# Patient Record
Sex: FEMALE | Race: WHITE | Employment: OTHER | URBAN - METROPOLITAN AREA
[De-identification: names, ages, dates, MRNs, and addresses within clinical notes are randomized per-mention and may not be internally consistent; named-entity substitution may affect disease eponyms.]

---

## 2017-11-13 ENCOUNTER — HOSPITAL ENCOUNTER (OUTPATIENT)
Dept: PREADMISSION TESTING | Age: 52
Discharge: HOME | End: 2017-11-13
Admitting: ORTHOPAEDIC SURGERY

## 2017-11-13 VITALS — HEIGHT: 66 IN | BODY MASS INDEX: 29.73 KG/M2 | WEIGHT: 185 LBS

## 2017-11-13 RX ORDER — TIZANIDINE 4 MG/1
4 TABLET ORAL NIGHTLY
COMMUNITY
End: 2019-04-16

## 2017-11-13 RX ORDER — MELOXICAM 15 MG/1
15 TABLET ORAL DAILY
COMMUNITY
End: 2019-04-16

## 2017-11-13 RX ORDER — CHLORHEXIDINE GLUCONATE 0.12 MG/ML
15 RINSE ORAL 2 TIMES DAILY
Status: CANCELLED | OUTPATIENT
Start: 2017-11-13 | End: 2017-11-14

## 2017-11-13 RX ORDER — METOPROLOL SUCCINATE 50 MG/1
50 TABLET, EXTENDED RELEASE ORAL DAILY
COMMUNITY
End: 2019-09-06 | Stop reason: SDUPTHER

## 2017-11-13 RX ORDER — CHLORHEXIDINE GLUCONATE 0.12 MG/ML
15 RINSE ORAL 2 TIMES DAILY
Status: CANCELLED | OUTPATIENT
Start: 2017-11-13

## 2017-11-13 RX ORDER — LITHIUM CARBONATE 300 MG/1
300 CAPSULE ORAL 2 TIMES DAILY
COMMUNITY
End: 2019-04-16

## 2017-11-13 RX ORDER — OXYCODONE AND ACETAMINOPHEN 10; 325 MG/1; MG/1
1 TABLET ORAL EVERY 4 HOURS PRN
COMMUNITY

## 2017-11-13 NOTE — PROGRESS NOTES
(exception would be medication instructions below only)     5. MEDICATIONS    Take the following medications with a SMALL sip of water: Prozac, Valium, Lithium, Metoprolol   Use your usual dose of inhalers the morning of surgery. BRING your rescue inhaler with you to hospital.    Anesthesia does NOT want you to take insulin the morning of surgery. They will control your blood sugar while you are at the hospital. Please contact your ordering physician for instructions regarding your insulin the night before your procedure. If you have an insulin pump, please keep it set on basal rate. 6. Do not swallow water when brushing teeth. No gum, candy, mints or ice chips. Refrain from smoking or at least decrease the amount. 7. Dress in loose, comfortable clothing appropriate for redressing after your procedure. Do not wear jewelry (including body piercings), make-up (especially NO eye make-up), fingernail polish (NO toenail polish if foot/leg surgery), lotion, powders or metal hairclips. 8. Dentures, glasses, or contacts will need to be removed before your procedure. Bring cases for your glasses, contacts, dentures, or hearing aids to protect them while you are in surgery. 9. If you use a CPAP, please bring it with you on the day of your procedure. 10. We recommend that valuable personal  belongings, such as credit cards, cash, cell phones, e-tablets or jewelry, be left at home during your stay. The hospital will not be responsible for valuables that are not secured in the hospital safe. However, if your insurance requires a co-pay, you may want to bring a method of payment, i.e. Check or credit card, if you wish to pay your co-pay the day of surgery. 11. If you are to stay overnight, you may bring a bag with personal items. Please have any large items you may need brought in by your family after your arrival to your hospital room.     12. If you have a Living Will or Durable Power of [employer], please bring a copy on the day of your procedure. 15.  With your permission, one family member may accompany you while you are being prepared for surgery. Once you are ready, additional family members may join you. HOW WE KEEP YOU SAFE and WORK TO PREVENT SURGICAL SITE INFECTIONS:  1. Health care workers should always check your ID bracelet to verify your name and birth date. You will be asked many times to state your name, date of birth, and allergies. 2. Health care workers should always clean their hands with soap or alcohol gel before providing care to you. It is okay to ask anyone if they cleaned their hands before they touch you. 3. You will be actively involved in verifying the type of procedure you are having and ensuring the correct surgical site. This will be confirmed multiple times prior to your procedure. Do NOT ryan your surgery site UNLESS instructed to by your surgeon. 4. Do not shave or wax for 72 hours prior to procedure near your operative site. Shaving with a razor can irritate your skin and make it easier to develop an infection. On the day of your procedure, any hair that needs to be removed near the surgical site will be clipped by a healthcare worker using a special clippers designed to avoid skin irritation. 5. When you are in the operating room, your surgical site will be cleansed with a special soap, and in most cases, you will be given an antibiotic before the surgery begins. AFTER YOUR PROCEDURE:  1. For comfort and safety, arrange to have someone at home with you for the first 24 hours after discharge. 2. You and your family will be given written instructions about your diet, activity, dressing care, medications, and return visits. 3. Always clean your hands before and after caring for your wound. Do not let your family touch your surgery site without cleaning their hands.    4. Mild nausea, headache, muscle aches, sore throat, or fatigue may occur after anesthesia. Should any of these symptoms become severe, or should you notice any signs of infection, you should call your surgeon. 5. Narcotic pain medications can cause significant constipation. You may want to add a stool softener to your postoperative medication schedule or speak to your surgeon on how best to manage this SIDE EFFECT. SPECIAL INSTRUCTIONS     Thank you for allowing us to care for you. We strive to exceed your expectations in the overall delivery of care and service provided to you and your family. If you need to contact us for any reason, please call us at [phone]    Instructions reviewed and copy given to patient during preadmission testing visit. Soumya Tex. 11/13/2017 .1:43 PM      ADDITIONAL EDUCATIONAL INFORMATION REVIEWED / PROVIDED TO YOU AND YOUR FAMILY:  Yes Taking Control of Your Pain   Yes FAQs about Surgical Site Infections  No Cardiac Surgery Instructions for AM admission to the hospital  No Learning About Preventing Pressure Sores  No Cardiac Surgery Preoperative Hibiclens® Bathing Instructions  No Your Care after Heart Surgery Binder  No Your Guide to Hip Replacement Surgery. PLEASE BRING THIS BOOKLET BACK ON THE DAY OF YOUR SURGERY. No Your Guide to Knee Replacement Surgery. PLEASE BRING THIS BOOKLET BACK ON THE DAY OF YOUR SURGERY. No Your Guide to Shoulder Replacement Surgery. PLEASE BRING THIS BOOKLET BACK ON THE DAY OF YOUR SURGERY.     No Hibiclens® Bathing Instructions   No Incentive Spirometer given to patient- PLEASE BRING THIS SPIROMETER BACK WITH YOU ON THE DAY OF YOUR SURGERY  No CMS Comprehensive Care for Joint Replacement Model Notification Letter  No Other

## 2017-11-13 NOTE — PROGRESS NOTES
[facility] / [address] [facility] Alberto Turner, [address]    Acknowledgment of Informed Consent for Surgical or Medical Procedure and Sedation  I agree to allow doctor(s) FINESSE COOL [address] [place] [address] and his/her associates or assistants, including residents and/or other qualified medical practitioner to perform the following medical treatment or procedure and to administer or direct the administration of sedation as necessary:  Procedure(s): L5-S1 LUMBAR LAMINECTOMY FACETECTOMY FORAMINOTOMY TRANSFORAMINAL LUMBAR INTERBODY INTRUMENTED FUSION  My doctor has explained the following regarding the proposed procedure:   the explanation of the procedure   the benefits of the procedure   the potential problems that might occur during recuperation   the risks and side effects of the procedure which could include but are not limited to severe blood loss, infection, stroke or death   the benefits, risks and side effect of alternative procedures including the consequences of declining this procedure or any alternative procedures   the likelihood of achieving satisfactory results. I acknowledge no guarantee or assurance has been made to me regarding the results. I understand that during the course of this treatment/procedure, unforeseen conditions can occur which require an additional or different procedure. I agree to allow my physician or assistants to perform such extension of the original procedure as they may find necessary. I understand that sedation will often result in temporary impairment of memory and fine motor skills and that sedation can occasionally progress to a state of deep sedation or general anesthesia. I understand the risks of anesthesia for surgery include, but are not limited to, sore throat, hoarseness, injury to face, mouth, or teeth; nausea; headache; injury to blood vessels or nerves; death, brain damage, or paralysis.     I understand that if I have a Limitation of Treatment order in effect during my hospitalization, the order may or may not be in effect during this procedure. I give my doctor permission to give me blood or blood products. I understand that there are risks with receiving blood such as hepatitis, AIDS, fever, or allergic reaction. I acknowledge that the risks, benefits, and alternatives of this treatment have been explained to me and that no express or implied warranty has been given by the hospital, any blood bank, or any person or entity as to the blood or blood components transfused. At the discretion of my doctor, I agree to allow observers, equipment/product representatives and allow photographing, and/or televising of the procedure, provided my name or identity is maintained confidentially. I agree the hospital may dispose of or use for scientific or educational purposes any tissue, fluid, or body parts which may be removed.     ________________________________Date________Time______ am/pm  ([place] [facility])  Patient or Signature of Closest Relative or Legal Guardian    ________________________________Date________Time______am/pm      Page 1 of  1  Witness

## 2017-11-13 NOTE — PROGRESS NOTES
The following educational items and goals will be achieved upon completion of the patient's Pre-admission testing experience:             Identify the learner who is being assessed for education:  patient                    Ability to Learn:  Exhibits ability to grasp concepts and respond to questions: High  Ready to Learn: Yes  calm   Preferred Method of Learning:  verbal  Barriers to Learning: Verbalizes interest  Special Considerations due to cultural, Pentecostal, spiritual beliefs:  No  Language:  English  :  Leticia Mcdonald  [x] Appropriate evaluation / integration of data as delineated by ASPAN Standards of Perianesthesia Nursing Practice    Pain scale and pain management   [x]Patient verbalizes understanding of pain scale and pain management  [x]Pre-operative determination of patients anticipated Post-Operative pain goal:   4 of 10 on 10 point scale post op goal  [] Other     Medication(s) - Compliance with preop medication instructions  [x] Patient verbalizes understanding of preop medications (see [facility] Presurgical Instructions)    Instructions, Pre op                                                                                            [x] Patient verbalizes understanding of presurgical instructions as reviewed with phone interview nurse or in-person nurse review    Fall Risk Potential, Preoperatively                                                                                   [x]No preoperative risk identified  []Preop risk identified:                    []Sensory deficit        []Motor deficit        []Balance problem        []Home medication        []Uses assistive device                    []History of a Fall within the last 30 days    Goal(s) for fall prevention:  [x]Prevent fall or injury by requesting assistance with activities of daily living  [x]Patient / Significant other verbalizes understanding the need to call for

## 2017-11-15 PROBLEM — F32.A DEPRESSION: Status: ACTIVE | Noted: 2017-11-15

## 2017-11-15 PROBLEM — M50.30 DEGENERATION OF CERVICAL INTERVERTEBRAL DISC: Status: ACTIVE | Noted: 2017-11-15

## 2017-11-15 PROBLEM — M48.061 LUMBAR SPINAL STENOSIS: Status: ACTIVE | Noted: 2017-11-15

## 2017-11-15 PROBLEM — F32.A DEPRESSION: Status: RESOLVED | Noted: 2017-11-15 | Resolved: 2017-11-15

## 2017-11-15 PROBLEM — I10 HYPERTENSION: Status: ACTIVE | Noted: 2017-11-15

## 2017-11-15 PROBLEM — M48.062 SPINAL STENOSIS OF LUMBAR REGION WITH NEUROGENIC CLAUDICATION: Status: ACTIVE | Noted: 2017-11-15

## 2017-11-15 PROBLEM — F31.9 BIPOLAR 1 DISORDER (HCC): Status: ACTIVE | Noted: 2017-11-15

## 2018-07-25 PROBLEM — N26.1 ATROPHIC KIDNEY: Status: ACTIVE | Noted: 2018-07-25

## 2018-11-17 ENCOUNTER — APPOINTMENT (OUTPATIENT)
Dept: GENERAL RADIOLOGY | Age: 53
End: 2018-11-17
Payer: MEDICARE

## 2018-11-17 ENCOUNTER — APPOINTMENT (OUTPATIENT)
Dept: CT IMAGING | Age: 53
End: 2018-11-17
Payer: MEDICARE

## 2018-11-17 ENCOUNTER — HOSPITAL ENCOUNTER (EMERGENCY)
Age: 53
Discharge: ANOTHER HOSPITAL | End: 2018-11-17
Attending: EMERGENCY MEDICINE
Payer: MEDICARE

## 2018-11-17 VITALS
RESPIRATION RATE: 15 BRPM | SYSTOLIC BLOOD PRESSURE: 133 MMHG | HEART RATE: 68 BPM | BODY MASS INDEX: 32.76 KG/M2 | HEIGHT: 65 IN | OXYGEN SATURATION: 98 % | DIASTOLIC BLOOD PRESSURE: 96 MMHG | TEMPERATURE: 98 F | WEIGHT: 196.65 LBS

## 2018-11-17 DIAGNOSIS — F10.929 ALCOHOLIC INTOXICATION WITH COMPLICATION (HCC): ICD-10-CM

## 2018-11-17 DIAGNOSIS — S52.601A CLOSED FRACTURE OF DISTAL ENDS OF RIGHT RADIUS AND ULNA, INITIAL ENCOUNTER: ICD-10-CM
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
## 2018-11-17 DIAGNOSIS — S12.000A CLOSED DISPLACED FRACTURE OF FIRST CERVICAL VERTEBRA, UNSPECIFIED FRACTURE MORPHOLOGY, INITIAL ENCOUNTER (HCC): Primary | ICD-10-CM

## 2018-11-17 DIAGNOSIS — S49.91XA INJURY OF RIGHT SHOULDER, INITIAL ENCOUNTER: ICD-10-CM

## 2018-11-17 DIAGNOSIS — S52.501A CLOSED FRACTURE OF DISTAL ENDS OF RIGHT RADIUS AND ULNA, INITIAL ENCOUNTER: ICD-10-CM

## 2018-11-17 LAB
A/G RATIO: 1.3 (ref 1.1–2.2)
ALBUMIN SERPL-MCNC: 3.8 G/DL (ref 3.4–5)
ALP BLD-CCNC: 127 U/L (ref 40–129)
ALT SERPL-CCNC: 21 U/L (ref 10–40)
ANION GAP SERPL CALCULATED.3IONS-SCNC: 14 MMOL/L (ref 3–16)
APTT: 19.5 SEC (ref 26–36)
AST SERPL-CCNC: 34 U/L (ref 15–37)
BILIRUB SERPL-MCNC: <0.2 MG/DL (ref 0–1)
BUN BLDV-MCNC: 12 MG/DL (ref 7–20)
CALCIUM SERPL-MCNC: 8.5 MG/DL (ref 8.3–10.6)
CHLORIDE BLD-SCNC: 101 MMOL/L (ref 99–110)
CO2: 22 MMOL/L (ref 21–32)
CREAT SERPL-MCNC: 1 MG/DL (ref 0.6–1.1)
ETHANOL: 164 MG/DL (ref 0–0.08)
GFR AFRICAN AMERICAN: >60
GFR NON-AFRICAN AMERICAN: 58
GLOBULIN: 3 G/DL
GLUCOSE BLD-MCNC: 114 MG/DL (ref 70–99)
HCT VFR BLD CALC: 34.3 % (ref 36–48)
HEMOGLOBIN: 11.4 G/DL (ref 12–16)
INR BLD: 0.87 (ref 0.86–1.14)
MCH RBC QN AUTO: 27 PG (ref 26–34)
MCHC RBC AUTO-ENTMCNC: 33.2 G/DL (ref 31–36)
MCV RBC AUTO: 81.2 FL (ref 80–100)
PDW BLD-RTO: 16.5 % (ref 12.4–15.4)
PLATELET # BLD: 176 K/UL (ref 135–450)
PLATELET SLIDE REVIEW: ADEQUATE
PMV BLD AUTO: 8.2 FL (ref 5–10.5)
POTASSIUM SERPL-SCNC: 4.5 MMOL/L (ref 3.5–5.1)
PROTHROMBIN TIME: 9.9 SEC (ref 9.8–13)
RBC # BLD: 4.22 M/UL (ref 4–5.2)
SODIUM BLD-SCNC: 137 MMOL/L (ref 136–145)
TOTAL PROTEIN: 6.8 G/DL (ref 6.4–8.2)
WBC # BLD: 9.1 K/UL (ref 4–11)

## 2018-11-17 PROCEDURE — 93005 ELECTROCARDIOGRAM TRACING: CPT | Performed by: PHYSICIAN ASSISTANT

## 2018-11-17 PROCEDURE — 72125 CT NECK SPINE W/O DYE: CPT

## 2018-11-17 PROCEDURE — 6360000002 HC RX W HCPCS: Performed by: PHYSICIAN ASSISTANT

## 2018-11-17 PROCEDURE — 90715 TDAP VACCINE 7 YRS/> IM: CPT | Performed by: PHYSICIAN ASSISTANT

## 2018-11-17 PROCEDURE — 85027 COMPLETE CBC AUTOMATED: CPT

## 2018-11-17 PROCEDURE — 73090 X-RAY EXAM OF FOREARM: CPT

## 2018-11-17 PROCEDURE — 99285 EMERGENCY DEPT VISIT HI MDM: CPT

## 2018-11-17 PROCEDURE — G0480 DRUG TEST DEF 1-7 CLASSES: HCPCS

## 2018-11-17 PROCEDURE — 70450 CT HEAD/BRAIN W/O DYE: CPT

## 2018-11-17 PROCEDURE — 73130 X-RAY EXAM OF HAND: CPT

## 2018-11-17 PROCEDURE — 80053 COMPREHEN METABOLIC PANEL: CPT

## 2018-11-17 PROCEDURE — 85610 PROTHROMBIN TIME: CPT

## 2018-11-17 PROCEDURE — 90471 IMMUNIZATION ADMIN: CPT | Performed by: PHYSICIAN ASSISTANT

## 2018-11-17 PROCEDURE — 73030 X-RAY EXAM OF SHOULDER: CPT

## 2018-11-17 PROCEDURE — 36415 COLL VENOUS BLD VENIPUNCTURE: CPT

## 2018-11-17 PROCEDURE — 4500000025 HC ED LEVEL 5 PROCEDURE

## 2018-11-17 PROCEDURE — 85730 THROMBOPLASTIN TIME PARTIAL: CPT

## 2018-11-17 RX ADMIN — TETANUS TOXOID, REDUCED DIPHTHERIA TOXOID AND ACELLULAR PERTUSSIS VACCINE, ADSORBED 0.5 ML: 5; 2.5; 8; 8; 2.5 SUSPENSION INTRAMUSCULAR at 20:33

## 2018-11-17 ASSESSMENT — PAIN DESCRIPTION - PAIN TYPE: TYPE: ACUTE PAIN

## 2018-11-17 ASSESSMENT — PAIN SCALES - GENERAL
PAINLEVEL_OUTOF10: 5
PAINLEVEL_OUTOF10: 6
PAINLEVEL_OUTOF10: 5

## 2018-11-17 ASSESSMENT — ENCOUNTER SYMPTOMS
SHORTNESS OF BREATH: 0
VOMITING: 0

## 2018-11-18 NOTE — ED NOTES
Bed: A-17  Expected date:   Expected time:   Means of arrival:   Comments:  Fall/ ETOH     Consuelo Crandall RN  11/17/18 [id number]

## 2018-11-18 NOTE — ED NOTES
Report called to [facility] JORDAN Fairchild.       Alida Hernandez RN  11/17/18 7922

## 2018-11-18 NOTE — ED PROVIDER NOTES
I independently performed a history and physical on Marci Peterson. All diagnostic, treatment, and disposition decisions were made by myself in conjunction with the advanced practice provider. Briefly, this is a 52 y.o. female here for fall that occurred at home. Patient is intoxicated, and also has other substances that she does abuse at home. Patient fell and is now having neck pain, and right shoulder pain. On exam, patient was kept in a rigid collar, and to go for CT scan of the head and neck. The neck x-ray/CT demonstrates a C1 fracture, the patient will be transferred to another facility for further evaluation. The patient will be going to [address] to be seen by neurosurgery. Patient was made aware of the findings, and was agreeable with transfer. For further details of Marci Peterson's emergency department encounter, please see documentation by advanced practice provider  Jose Kimbrough.        Nicola Guevara MD  11/18/18 2789

## 2018-11-18 NOTE — ED PROVIDER NOTES
breath. Cardiovascular: Negative for chest pain. Gastrointestinal: Negative for vomiting. Musculoskeletal: Positive for arthralgias and neck pain. Skin: Positive for wound. Neurological: Negative for weakness and numbness. Psychiatric/Behavioral: Positive for confusion. Unable to perform complete review of systems due to the patient's intoxication. PAST MEDICAL HISTORY         Diagnosis Date    Atrophic kidney 7/25/2018    left    Brachial neuritis or radiculitis NOS     Degeneration of cervical intervertebral disc     Depression     Hypertension     Neck pain     Shoulder pain     left    Single kidney        SURGICAL HISTORY           Procedure Laterality Date    BACK SURGERY      cervical 2008, 2010, 2014    KNEE ARTHROSCOPY Left     LUMBAR LAMINECTOMY  11/14/2017    L5-S1;transforaminal fusion,facetectomy,foraminotomy    OTHER SURGICAL HISTORY  4/15/14    SPINAL CORD STIMULATOR TRIAL WITH PADDLE LEADS -    OTHER SURGICAL HISTORY  4/18/2014    CERVICAL LAMINECTOMY AND IMPLANT SPINAL CORD STIMULATOR    TONSILLECTOMY         CURRENT MEDICATIONS       Discharge Medication List as of 11/17/2018 10:21 PM      CONTINUE these medications which have NOT CHANGED    Details   vitamin D-3 (CHOLECALCIFEROL) 5000 units TABS Take 1 tablet by mouth daily, Disp-30 tablet, R-2Print      Cholecalciferol (VITAMIN D3) 39267 units CAPS Take 1 capsule by mouth once a week for 5 days, Disp-5 capsule, R-0Print      oxyCODONE (ROXICODONE) 5 MG immediate release tablet Take 1-2 po q 4 hours for severe pain.  Earliest Fill Date: 11/14/17, Disp-60 tablet, R-0Print      senna-docusate (PERICOLACE) 8.6-50 MG per tablet Take 2 tablets by mouth daily as needed for Constipation, Disp-30 tablet, R-1Print      magnesium citrate solution Take 296 mLs by mouth once for 1 dose, Disp-296 mL, R-2Print      oxyCODONE-acetaminophen (PERCOCET)  MG per tablet Take 1 tablet by mouth every 4 hours as needed for Pain .Historical Med      tiZANidine (ZANAFLEX) 4 MG tablet Take 4 mg by mouth nightly Two tablets at nightHistorical Med      lithium 300 MG capsule Take 300 mg by mouth 2 times dailyHistorical Med      metoprolol succinate (TOPROL XL) 50 MG extended release tablet Take 50 mg by mouth daily Two tabletsHistorical Med      meloxicam (MOBIC) 15 MG tablet Take 15 mg by mouth dailyHistorical Med      gabapentin (NEURONTIN) 600 MG tablet Take 800 mg by mouth 3 times daily Historical Med      diazepam (VALIUM) 5 MG tablet Take 5 mg by mouth 3 times daily  . Historical Med      FLUoxetine (PROZAC) 10 MG capsule Take 40 mg by mouth 2 times daily Historical Med      Multiple Vitamins-Minerals (THERAPEUTIC MULTIVITAMIN-MINERALS) tablet Take 1 tablet by mouth daily. ALLERGIES     Patient has no known allergies. FAMILY HISTORY     History reviewed. No pertinent family history. No family status information on file. SOCIAL HISTORY      reports that she has been smoking. She has never used smokeless tobacco. She reports that she drinks alcohol. She reports that she does not use drugs. PHYSICAL EXAM    (up to 7 for level 4, 8 or more for level 5)     ED Triage Vitals   BP Temp Temp src Pulse Resp SpO2 Height Weight   -- -- -- -- -- -- -- --     Physical Exam   Constitutional: She appears well-developed and well-nourished. No distress. HENT:   Head: Head is with abrasion and with contusion. Neck: Spinous process tenderness present. Cardiovascular: Normal rate and intact distal pulses. Pulmonary/Chest: Effort normal. No respiratory distress. Musculoskeletal:        Right shoulder: She exhibits tenderness, bony tenderness and pain. She exhibits normal pulse. Right wrist: She exhibits decreased range of motion, tenderness, bony tenderness and swelling. Arms:  Neurological: She is alert. GCS eye subscore is 4. GCS verbal subscore is 5. GCS motor subscore is 6.    Psychiatric: Her speech is slurred. Cognition and memory are impaired. Nursing note and vitals reviewed. DIAGNOSTIC RESULTS     EKG: All EKG's are interpreted by HAWK Kingston in the absence of a cardiologist.    EKG interpreted by myself - please refer to attending physician's note for complete EKG interpretation:    Rhythm: sinus rhythm   No evidence of acute ischemia or injury. RADIOLOGY:   Non-plain film images such as CT, Ultrasound and MRI are read by the radiologist. Plain radiographic images are visualized and preliminarily interpreted by HAWK Kingston with the below findings:    Reviewed radiologist's dictation. Interpretation per the Radiologist below, if available at the time of this note:    XR HAND RIGHT (MIN 3 VIEWS)   Preliminary Result   1. Acute distal right radial metaphyseal fracture with mild ulnar and   posterior displacement. No definitive radiocarpal involvement. 2. Displaced and comminuted distal right ulnar shaft fracture. 3. Osteopenia. XR RADIUS ULNA RIGHT (2 VIEWS)   Final Result   1. Acute displaced linear fracture through the distal diaphysis of the right   ulna. 2.  Acute displaced to horizontal linear fracture through the distal   metaphysis of the right radius. CT Cervical Spine WO Contrast   Preliminary Result   1. Comminuted and displaced C1 ring fracture. Widening of the predental   space. Prevertebral hematoma. 2. Multilevel postsurgical change. This report was discussed with Dr. Janice Navarro at 9:15 a.m. on 11/17/2018. CT Head WO Contrast   Preliminary Result   1. No evidence of acute intracranial process. 2. Comminuted and displaced C1 ring fracture. 3. Large frontal scalp hematoma and laceration. This report was discussed with Dr. Edwin Larsen (MIN 2 VIEWS)   Final Result   Mild widening of the right acromioclavicular joint concerning for right AC   joint injury. Please correlate with focal point tenderness.

## 2018-11-18 NOTE — ED NOTES
MD Ky Foote at bedside for pt evaluation and plan of care update.       Michelle Avalos RN  11/17/18 2810

## 2018-11-19 LAB
EKG ATRIAL RATE: 79 BPM
EKG DIAGNOSIS: NORMAL
EKG P AXIS: 66 DEGREES
EKG P-R INTERVAL: 128 MS
EKG Q-T INTERVAL: 442 MS
EKG QRS DURATION: 98 MS
EKG QTC CALCULATION (BAZETT): 506 MS
EKG R AXIS: 11 DEGREES
EKG T AXIS: 32 DEGREES
EKG VENTRICULAR RATE: 79 BPM

## 2018-11-19 PROCEDURE — 93010 ELECTROCARDIOGRAM REPORT: CPT | Performed by: INTERNAL MEDICINE

## 2018-12-08 ENCOUNTER — HOSPITAL ENCOUNTER (EMERGENCY)
Age: 53
Discharge: HOME | End: 2018-12-08
Attending: EMERGENCY MEDICINE
Payer: MEDICARE

## 2018-12-08 VITALS
DIASTOLIC BLOOD PRESSURE: 99 MMHG | HEART RATE: 73 BPM | SYSTOLIC BLOOD PRESSURE: 152 MMHG | OXYGEN SATURATION: 99 % | RESPIRATION RATE: 17 BRPM

## 2018-12-08 DIAGNOSIS — M54.2 NECK PAIN: Primary | ICD-10-CM

## 2018-12-08 PROCEDURE — 6370000000 HC RX 637 (ALT 250 FOR IP): Performed by: EMERGENCY MEDICINE

## 2018-12-08 PROCEDURE — 99283 EMERGENCY DEPT VISIT LOW MDM: CPT

## 2018-12-08 RX ORDER — OXYCODONE HYDROCHLORIDE 5 MG/1
5 TABLET ORAL EVERY 4 HOURS PRN
Status: DISCONTINUED | OUTPATIENT
Start: 2018-12-08 | End: 2018-12-09 | Stop reason: HOSPADM

## 2018-12-08 RX ORDER — OXYCODONE HYDROCHLORIDE AND ACETAMINOPHEN 5; 325 MG/1; MG/1
1 TABLET ORAL ONCE
Status: COMPLETED | OUTPATIENT
Start: 2018-12-08 | End: 2018-12-08

## 2018-12-08 RX ADMIN — OXYCODONE HYDROCHLORIDE 5 MG: 5 TABLET ORAL at 22:12

## 2018-12-08 RX ADMIN — OXYCODONE AND ACETAMINOPHEN 1 TABLET: 5; 325 TABLET ORAL at 22:12

## 2018-12-08 ASSESSMENT — ENCOUNTER SYMPTOMS
ABDOMINAL PAIN: 0
SHORTNESS OF BREATH: 0
VOMITING: 0
COUGH: 0
NAUSEA: 0

## 2018-12-08 ASSESSMENT — PAIN SCALES - GENERAL: PAINLEVEL_OUTOF10: 10

## 2018-12-08 ASSESSMENT — PAIN DESCRIPTION - LOCATION: LOCATION: NECK

## 2018-12-08 ASSESSMENT — PAIN DESCRIPTION - DESCRIPTORS: DESCRIPTORS: ACHING

## 2018-12-08 ASSESSMENT — PAIN DESCRIPTION - PAIN TYPE: TYPE: CHRONIC PAIN

## 2018-12-09 NOTE — ED PROVIDER NOTES
been smoking. She has never used smokeless tobacco. She reports that she drinks alcohol. She reports that she does not use drugs. Medications     Previous Medications    CHOLECALCIFEROL (VITAMIN D3) 31480 UNITS CAPS    Take 1 capsule by mouth once a week for 5 days    DIAZEPAM (VALIUM) 5 MG TABLET    Take 5 mg by mouth 3 times daily  . FLUOXETINE (PROZAC) 10 MG CAPSULE    Take 40 mg by mouth 2 times daily     GABAPENTIN (NEURONTIN) 600 MG TABLET    Take 800 mg by mouth 3 times daily     LITHIUM 300 MG CAPSULE    Take 300 mg by mouth 2 times daily    MAGNESIUM CITRATE SOLUTION    Take 296 mLs by mouth once for 1 dose    MELOXICAM (MOBIC) 15 MG TABLET    Take 15 mg by mouth daily    METOPROLOL SUCCINATE (TOPROL XL) 50 MG EXTENDED RELEASE TABLET    Take 50 mg by mouth daily Two tablets    MULTIPLE VITAMINS-MINERALS (THERAPEUTIC MULTIVITAMIN-MINERALS) TABLET    Take 1 tablet by mouth daily. OXYCODONE (ROXICODONE) 5 MG IMMEDIATE RELEASE TABLET    Take 1-2 po q 4 hours for severe pain. Earliest Fill Date: 11/14/17    OXYCODONE-ACETAMINOPHEN (PERCOCET)  MG PER TABLET    Take 1 tablet by mouth every 4 hours as needed for Pain . SENNA-DOCUSATE (PERICOLACE) 8.6-50 MG PER TABLET    Take 2 tablets by mouth daily as needed for Constipation    TIZANIDINE (ZANAFLEX) 4 MG TABLET    Take 4 mg by mouth nightly Two tablets at night    VITAMIN D-3 (CHOLECALCIFEROL) 5000 UNITS TABS    Take 1 tablet by mouth daily       Allergies     She has No Known Allergies. Physical Exam     INITIAL VITALS: BP: (!) 152/99,  , Pulse: 73, Resp: 17, SpO2: 99 %   Physical Exam  Constitutional: Well nourished  female who appears in no acute distress. HEENT: NC/AT. PERRL 4-2 bilaterally. EOMI. her neck is in a [place] J brace    CV:Heart is regular rate and rhythm without murmurs, rubs or gallops. Resp: Respirations unlabored. Lungs clear to auscultation w/o wheezing. Abd: Soft, nontender, nondistended.      MSK:

## 2019-04-16 ENCOUNTER — OFFICE VISIT (OUTPATIENT)
Dept: FAMILY MEDICINE CLINIC | Age: 54
End: 2019-04-16
Payer: COMMERCIAL

## 2019-04-16 VITALS
HEIGHT: 66 IN | HEART RATE: 61 BPM | SYSTOLIC BLOOD PRESSURE: 118 MMHG | BODY MASS INDEX: 28.28 KG/M2 | WEIGHT: 176 LBS | DIASTOLIC BLOOD PRESSURE: 82 MMHG

## 2019-04-16 DIAGNOSIS — Z12.4 CERVICAL CANCER SCREENING: ICD-10-CM

## 2019-04-16 DIAGNOSIS — Z12.11 COLON CANCER SCREENING: ICD-10-CM

## 2019-04-16 DIAGNOSIS — I10 HYPERTENSION, UNSPECIFIED TYPE: ICD-10-CM

## 2019-04-16 DIAGNOSIS — D64.9 ANEMIA, UNSPECIFIED TYPE: ICD-10-CM

## 2019-04-16 DIAGNOSIS — M54.2 NECK PAIN: ICD-10-CM

## 2019-04-16 DIAGNOSIS — Z12.39 BREAST CANCER SCREENING: ICD-10-CM

## 2019-04-16 DIAGNOSIS — F31.9 BIPOLAR 1 DISORDER (HCC): Primary | ICD-10-CM

## 2019-04-16 LAB
A/G RATIO: 1.6 (ref 1.1–2.2)
ALBUMIN SERPL-MCNC: 4.3 G/DL (ref 3.4–5)
ALP BLD-CCNC: 138 U/L (ref 40–129)
ALT SERPL-CCNC: 23 U/L (ref 10–40)
ANION GAP SERPL CALCULATED.3IONS-SCNC: 11 MMOL/L (ref 3–16)
AST SERPL-CCNC: 21 U/L (ref 15–37)
BASOPHILS ABSOLUTE: 0.1 K/UL (ref 0–0.2)
BASOPHILS RELATIVE PERCENT: 0.9 %
BILIRUB SERPL-MCNC: <0.2 MG/DL (ref 0–1)
BUN BLDV-MCNC: 18 MG/DL (ref 7–20)
CALCIUM SERPL-MCNC: 9.3 MG/DL (ref 8.3–10.6)
CHLORIDE BLD-SCNC: 104 MMOL/L (ref 99–110)
CO2: 26 MMOL/L (ref 21–32)
CREAT SERPL-MCNC: 1.3 MG/DL (ref 0.6–1.1)
EOSINOPHILS ABSOLUTE: 0.4 K/UL (ref 0–0.6)
EOSINOPHILS RELATIVE PERCENT: 3.7 %
GFR AFRICAN AMERICAN: 51
GFR NON-AFRICAN AMERICAN: 42
GLOBULIN: 2.7 G/DL
GLUCOSE BLD-MCNC: 88 MG/DL (ref 70–99)
HCT VFR BLD CALC: 41.7 % (ref 36–48)
HEMOGLOBIN: 13.7 G/DL (ref 12–16)
LYMPHOCYTES ABSOLUTE: 2 K/UL (ref 1–5.1)
LYMPHOCYTES RELATIVE PERCENT: 21 %
MCH RBC QN AUTO: 28.9 PG (ref 26–34)
MCHC RBC AUTO-ENTMCNC: 32.8 G/DL (ref 31–36)
MCV RBC AUTO: 88.3 FL (ref 80–100)
MONOCYTES ABSOLUTE: 0.8 K/UL (ref 0–1.3)
MONOCYTES RELATIVE PERCENT: 8.3 %
NEUTROPHILS ABSOLUTE: 6.3 K/UL (ref 1.7–7.7)
NEUTROPHILS RELATIVE PERCENT: 66.1 %
PDW BLD-RTO: 18 % (ref 12.4–15.4)
PLATELET # BLD: 440 K/UL (ref 135–450)
PMV BLD AUTO: 7.8 FL (ref 5–10.5)
POTASSIUM SERPL-SCNC: 5.6 MMOL/L (ref 3.5–5.1)
RBC # BLD: 4.72 M/UL (ref 4–5.2)
SODIUM BLD-SCNC: 141 MMOL/L (ref 136–145)
TOTAL PROTEIN: 7 G/DL (ref 6.4–8.2)
TSH SERPL DL<=0.05 MIU/L-ACNC: 2.23 UIU/ML (ref 0.27–4.2)
WBC # BLD: 9.5 K/UL (ref 4–11)

## 2019-04-16 PROCEDURE — 36415 COLL VENOUS BLD VENIPUNCTURE: CPT | Performed by: FAMILY MEDICINE

## 2019-04-16 PROCEDURE — 99204 OFFICE O/P NEW MOD 45 MIN: CPT | Performed by: FAMILY MEDICINE

## 2019-04-16 ASSESSMENT — ENCOUNTER SYMPTOMS
ABDOMINAL PAIN: 0
RHINORRHEA: 0
SORE THROAT: 0
SINUS PRESSURE: 0
DIARRHEA: 0
FACIAL SWELLING: 0
COUGH: 0
SHORTNESS OF BREATH: 0
NAUSEA: 0
CHEST TIGHTNESS: 0
VOMITING: 0
WHEEZING: 0
TROUBLE SWALLOWING: 0

## 2019-04-16 NOTE — PROGRESS NOTES
2019    Marci Peterson (:  1965) is a 52 y.o. female, here to establish care with the pcp. Patient has numerous concerns and is very complex. She wants to discuss her concerns but doesn't have a new problem today. She has a Psychiatry evaluation next month. 1.  Bipolar- patient has an upcoming appointment with Psychiatry. She is on Prozac 80 mg daily as well as Valium 5 mg TID. She has been on this combination for several years but believes it is not as effective as it once was. 2.  Neck pain- had surgery last year due to cervical fracture. She has also had multiple surgeries for spinal stenosis. Patient is on pain medication by another provider and stated this is the only relief she get. Her physician is comfortable for her being on both an opiate and Valium understanding that this seems to be the only treatment that providers her with an acceptable quality of life. 3.  Anemia- believes it is iron def. Hasn't been tested for sometime. Wants labs today. Denied excessive fatigue, denied bleeding, did acknowledge she needed a colonoscopy as well as other well being procedures. 4. HTN- well controlled, takes medication as prescribed, doesn't believe she is having side effects from the medication. Today, she denied chest pain, sob, n, v, or diarrhea. HPI    Review of Systems   Constitutional: Positive for fatigue. Negative for activity change, fever and unexpected weight change. HENT: Negative for congestion, ear pain, facial swelling, mouth sores, rhinorrhea, sinus pressure, sore throat and trouble swallowing. Eyes: Negative for visual disturbance. Respiratory: Negative for cough, chest tightness, shortness of breath and wheezing. Cardiovascular: Negative for chest pain and leg swelling. Gastrointestinal: Negative for abdominal pain, diarrhea, nausea and vomiting. Endocrine: Negative for cold intolerance, heat intolerance, polydipsia and polyphagia. STIMULATOR TRIAL WITH PADDLE LEADS -    OTHER SURGICAL HISTORY  2014    CERVICAL LAMINECTOMY AND IMPLANT SPINAL CORD STIMULATOR    TONSILLECTOMY         Social History     Socioeconomic History    Marital status:      Spouse name: Not on file    Number of children: Not on file    Years of education: Not on file    Highest education level: Not on file   Occupational History    Not on file   Social Needs    Financial resource strain: Not on file    Food insecurity:     Worry: Not on file     Inability: Not on file    Transportation needs:     Medical: Not on file     Non-medical: Not on file   Tobacco Use    Smoking status: Former Smoker     Packs/day: 0.25     Years: 40.00     Pack years: 10.00     Types: Cigarettes     Last attempt to quit: 2016     Years since quittin.4    Smokeless tobacco: Never Used   Substance and Sexual Activity    Alcohol use: Yes     Comment: rarely    Drug use: No    Sexual activity: Never   Lifestyle    Physical activity:     Days per week: Not on file     Minutes per session: Not on file    Stress: Not on file   Relationships    Social connections:     Talks on phone: Not on file     Gets together: Not on file     Attends [employer] service: Not on file     Active member of club or organization: Not on file     Attends meetings of clubs or organizations: Not on file     Relationship status: Not on file    Intimate partner violence:     Fear of current or ex partner: Not on file     Emotionally abused: Not on file     Physically abused: Not on file     Forced sexual activity: Not on file   Other Topics Concern    Not on file   Social History Narrative    Not on file        No family history on file. Vitals:    19 1023   BP: 118/82   Pulse: 61   Weight: 176 lb (79.8 kg)   Height: 5' 5.5\" (1.664 m)     Estimated body mass index is 28.84 kg/m² as calculated from the following:    Height as of this encounter: 5' 5.5\" (1.664 m).     Weight as of this encounter: 176 lb (79.8 kg). Chemistry        Component Value Date/Time     04/16/2019 1103    K 5.6 (H) 04/16/2019 1103    K 3.6 06/24/2018 1751     04/16/2019 1103    CO2 26 04/16/2019 1103    BUN 18 04/16/2019 1103    CREATININE 1.3 (H) 04/16/2019 1103        Component Value Date/Time    CALCIUM 9.3 04/16/2019 1103    ALKPHOS 138 (H) 04/16/2019 1103    AST 21 04/16/2019 1103    ALT 23 04/16/2019 1103    BILITOT <0.2 04/16/2019 1103          Lab Results   Component Value Date    WBC 9.5 04/16/2019    HGB 13.7 04/16/2019    HCT 41.7 04/16/2019    MCV 88.3 04/16/2019     04/16/2019     No results found for: LABA1C  No results found for: EAG  No results found for: LABA1C  No components found for: CHLPL  No results found for: TRIG  No results found for: HDL  No results found for: LDLCALC  No results found for: LABVLDL    Physical Exam   Constitutional: She is oriented to person, place, and time. She appears well-developed and well-nourished. HENT:   Head: Normocephalic and atraumatic. Right Ear: External ear normal.   Left Ear: External ear normal.   Nose: Nose normal.   Mouth/Throat: Oropharynx is clear and moist.   Eyes: Pupils are equal, round, and reactive to light. Conjunctivae are normal.   Neck: Neck supple. No thyromegaly present. Surgical cervical scar, reduced ROM in all directed, pain with palpation. Cardiovascular: Normal rate, regular rhythm, normal heart sounds and intact distal pulses. Pulmonary/Chest: Effort normal and breath sounds normal. She has no wheezes. Abdominal: Soft. Bowel sounds are normal. There is no tenderness. There is no guarding. Musculoskeletal: Normal range of motion. Lymphadenopathy:     She has no cervical adenopathy. Neurological: She is alert and oriented to person, place, and time. No cranial nerve deficit. Skin: Skin is warm and dry.    Psychiatric: Her behavior is normal. Judgment and thought content normal.   Crying at times, laughing at times. She added that this is baseline for her. ASSESSMENT/PLAN:  1. Bipolar 1 disorder ([address]  Very difficult has upcoming appointment with Pysc. Taking maximum dose of Prozac, Valium TID but I truly don't know the patient well enough but informed her I would not write this medication. Has been on multiple medications that haven't worked and was last put on Lithium but refuses to take the medication. Patient stated she is stable at this point. Will obtain labs. Answered her questions. Discussed other options. She will rtc after Psyc. Evaluation.   - Comprehensive Metabolic Panel  - TSH without Reflex  - CBC Auto Differential    2. Anemia, unspecified type  Checking labs today. - Comprehensive Metabolic Panel  - TSH without Reflex  - CBC Auto Differential    3. Neck pain  Stable  Continue with physician. I informed the patient that I do not write pain medications. She undestood. 4. Hypertension, unspecified type  Stable   Continue on medication. 5. Colon cancer screening  Colonoscopy ordered. - Brock Saldaña MD, Gastroenterology, [facility]    6. Cervical cancer screening  Referral for evaluation. Patient can't remember her last pap smear.   - Jennifer Rizzo MD, Gynecology, [facility]    7. Breast cancer screening  Mammogram ordered. RTC in one month for follow up. - JOSE ANTONIO DIGITAL SCREEN W CAD BILATERAL; Future      Return in about 1 month (around 5/16/2019) for Folllow up mood.

## 2019-04-16 NOTE — PATIENT INSTRUCTIONS
Please keep your appointments. Labs will be obtained today. Make your appointments with specialists. Monitor your diet and exercise. RTC after your psychiatry evaluation. Thank you for coming.

## 2019-04-19 ENCOUNTER — TELEPHONE (OUTPATIENT)
Dept: FAMILY MEDICINE CLINIC | Age: 54
End: 2019-04-19

## 2019-04-26 ENCOUNTER — OFFICE VISIT (OUTPATIENT)
Dept: GYNECOLOGY | Age: 54
End: 2019-04-26
Payer: COMMERCIAL

## 2019-04-26 VITALS
DIASTOLIC BLOOD PRESSURE: 85 MMHG | HEIGHT: 65 IN | SYSTOLIC BLOOD PRESSURE: 144 MMHG | BODY MASS INDEX: 29.82 KG/M2 | WEIGHT: 179 LBS | HEART RATE: 65 BPM | TEMPERATURE: 98.4 F

## 2019-04-26 DIAGNOSIS — Z01.419 WELL WOMAN EXAM WITH ROUTINE GYNECOLOGICAL EXAM: Primary | ICD-10-CM

## 2019-04-26 PROCEDURE — G0101 CA SCREEN;PELVIC/BREAST EXAM: HCPCS | Performed by: OBSTETRICS & GYNECOLOGY

## 2019-04-26 NOTE — PROGRESS NOTES
new patient last pap 20-30 years ago. 4/16/19 mammo ordered has to sched. never had colonoscopy. Referred to dr Akshat Lyons.

## 2019-04-26 NOTE — PROGRESS NOTES
Subjective:      Patient ID: Heywood Bamberger is a 52 y.o. female. HPI  pts here for annual gyn exam.  She denies complaints. No recent pap, mammogram, or colonoscopy. All ordered. No bleeding >5 years. She's enjoying her new dog Yury. Review of Systems Pertinent review of systems items discussed above. All others systems items not discussed above were negative. Objective:   Physical Exam   Constitutional: She is oriented to person, place, and time. She appears well-developed and well-nourished. HENT:   Head: Normocephalic and atraumatic. Neck: No tracheal deviation present. No thyromegaly present. Cardiovascular: Normal rate, regular rhythm and normal heart sounds. No murmur heard. Pulmonary/Chest: Effort normal and breath sounds normal. No respiratory distress. She has no wheezes. She has no rales. Right breast exhibits no mass, no nipple discharge and no skin change. Left breast exhibits no mass, no nipple discharge and no skin change. No breast tenderness (no masses), discharge or bleeding. Abdominal: Soft. She exhibits no distension and no mass. There is no tenderness. There is no rebound. Genitourinary: Rectum normal, vagina normal and uterus normal. Rectal exam shows no external hemorrhoid, no mass and guaiac negative stool. No breast tenderness (no masses), discharge or bleeding. There is no lesion on the right labia. There is no lesion on the left labia. Uterus is not deviated, not enlarged, not fixed and not tender. Cervix exhibits no motion tenderness, no discharge and no friability. Right adnexum displays no mass and no tenderness. Left adnexum displays no mass and no tenderness. No foreign body in the vagina. No vaginal discharge found. Genitourinary Comments: Pap performed. Musculoskeletal: Normal range of motion. Lymphadenopathy:     She has no cervical adenopathy. Neurological: She is alert and oriented to person, place, and time.        Assessment: Normal gyn exam      Plan:      F/u annual gyn exam.  Call with results.         Anna Marie Santa MD

## 2019-05-01 LAB
HPV COMMENT: ABNORMAL
HPV TYPE 16: NOT DETECTED
HPV TYPE 18: DETECTED
HPVOH (OTHER TYPES): NOT DETECTED

## 2019-05-21 ENCOUNTER — OFFICE VISIT (OUTPATIENT)
Dept: GYNECOLOGY | Age: 54
End: 2019-05-21
Payer: COMMERCIAL

## 2019-05-21 VITALS
HEART RATE: 57 BPM | BODY MASS INDEX: 30.39 KG/M2 | WEIGHT: 182.4 LBS | SYSTOLIC BLOOD PRESSURE: 174 MMHG | DIASTOLIC BLOOD PRESSURE: 108 MMHG | HEIGHT: 65 IN | TEMPERATURE: 98.5 F

## 2019-05-21 DIAGNOSIS — B97.7 HPV IN FEMALE: Primary | ICD-10-CM

## 2019-05-21 LAB
CONTROL: NORMAL
PREGNANCY TEST URINE, POC: NORMAL

## 2019-05-21 PROCEDURE — 57454 BX/CURETT OF CERVIX W/SCOPE: CPT | Performed by: OBSTETRICS & GYNECOLOGY

## 2019-05-21 PROCEDURE — 81025 URINE PREGNANCY TEST: CPT | Performed by: OBSTETRICS & GYNECOLOGY

## 2019-05-21 RX ORDER — VITAMIN B COMPLEX
1 CAPSULE ORAL DAILY
COMMUNITY

## 2019-05-21 RX ORDER — IBUPROFEN 800 MG/1
800 TABLET ORAL EVERY 6 HOURS PRN
COMMUNITY
End: 2019-09-09 | Stop reason: ALTCHOICE

## 2019-05-21 NOTE — PROGRESS NOTES
Colposcopy Procedure Note    Indications: Pap smear 1 months ago showed: HPV. The prior pap showed no abnormalities. Prior cervical/vaginal disease: normal exam without visible pathology. Prior cervical treatment: no treatment. Procedure Details   The risks and benefits of the procedure and Written informed consent obtained. Speculum placed in vagina and excellent visualization of cervix achieved, cervix swabbed x 3 with acetic acid solution. Findings:  Cervix: acetowhite lesion(s) noted at 12 o'clock; SCJ visualized - lesion at 12 o'clock, endocervical curettage performed, cervical biopsies taken at 12 o'clock, specimen labelled and sent to pathology and hemostasis achieved with Monsel's solution. Vaginal inspection: normal without visible lesions. Vulvar colposcopy: normal mucosa without lesions. Specimens: [id number], ecc    Complications: none. Plan:  Specimens labelled and sent to Pathology. Will base further treatment on Pathology findings. Post biopsy instructions given to patient.

## 2019-05-24 ENCOUNTER — OFFICE VISIT (OUTPATIENT)
Dept: FAMILY MEDICINE CLINIC | Age: 54
End: 2019-05-24
Payer: COMMERCIAL

## 2019-05-24 ENCOUNTER — TELEPHONE (OUTPATIENT)
Dept: FAMILY MEDICINE CLINIC | Age: 54
End: 2019-05-24

## 2019-05-24 VITALS
BODY MASS INDEX: 30.32 KG/M2 | HEIGHT: 65 IN | WEIGHT: 182 LBS | DIASTOLIC BLOOD PRESSURE: 80 MMHG | SYSTOLIC BLOOD PRESSURE: 122 MMHG

## 2019-05-24 DIAGNOSIS — I10 ESSENTIAL HYPERTENSION: ICD-10-CM

## 2019-05-24 DIAGNOSIS — I10 ESSENTIAL HYPERTENSION: Primary | ICD-10-CM

## 2019-05-24 DIAGNOSIS — F31.9 BIPOLAR 1 DISORDER (HCC): Primary | ICD-10-CM

## 2019-05-24 PROBLEM — R03.0 ELEVATED BP WITHOUT DIAGNOSIS OF HYPERTENSION: Status: ACTIVE | Noted: 2019-05-24

## 2019-05-24 PROCEDURE — A4663 DIALYSIS BLOOD PRESSURE CUFF: HCPCS | Performed by: FAMILY MEDICINE

## 2019-05-24 PROCEDURE — 99213 OFFICE O/P EST LOW 20 MIN: CPT | Performed by: FAMILY MEDICINE

## 2019-05-24 RX ORDER — BLOOD PRESSURE TEST KIT
KIT MISCELLANEOUS
Qty: 1 KIT | Refills: 0 | Status: SHIPPED | OUTPATIENT
Start: 2019-05-24

## 2019-05-24 ASSESSMENT — ENCOUNTER SYMPTOMS
VOMITING: 0
COUGH: 0
SINUS PRESSURE: 0
WHEEZING: 0
SORE THROAT: 0
BACK PAIN: 1
FACIAL SWELLING: 0
RHINORRHEA: 0
NAUSEA: 0
CHEST TIGHTNESS: 0
SHORTNESS OF BREATH: 0
ABDOMINAL PAIN: 0
DIARRHEA: 0

## 2019-05-24 ASSESSMENT — PATIENT HEALTH QUESTIONNAIRE - PHQ9
2. FEELING DOWN, DEPRESSED OR HOPELESS: 0
1. LITTLE INTEREST OR PLEASURE IN DOING THINGS: 0
SUM OF ALL RESPONSES TO PHQ9 QUESTIONS 1 & 2: 0
SUM OF ALL RESPONSES TO PHQ QUESTIONS 1-9: 0
SUM OF ALL RESPONSES TO PHQ QUESTIONS 1-9: 0

## 2019-05-24 NOTE — PATIENT INSTRUCTIONS
Take your medication as prescribed. Please rtc for regular appointment. Please obtain BP cuff keep readings and bring these back. If they are elevated please come back.

## 2019-08-09 ENCOUNTER — OFFICE VISIT (OUTPATIENT)
Dept: FAMILY MEDICINE CLINIC | Age: 54
End: 2019-08-09
Payer: COMMERCIAL

## 2019-08-09 VITALS
WEIGHT: 181 LBS | BODY MASS INDEX: 30.16 KG/M2 | DIASTOLIC BLOOD PRESSURE: 90 MMHG | HEART RATE: 64 BPM | HEIGHT: 65 IN | SYSTOLIC BLOOD PRESSURE: 158 MMHG

## 2019-08-09 DIAGNOSIS — I10 ESSENTIAL HYPERTENSION: Primary | ICD-10-CM

## 2019-08-09 DIAGNOSIS — R42 LIGHTHEADEDNESS: ICD-10-CM

## 2019-08-09 LAB
ANION GAP SERPL CALCULATED.3IONS-SCNC: 15 MMOL/L (ref 3–16)
BUN BLDV-MCNC: 17 MG/DL (ref 7–20)
CALCIUM SERPL-MCNC: 10.4 MG/DL (ref 8.3–10.6)
CHLORIDE BLD-SCNC: 105 MMOL/L (ref 99–110)
CHOLESTEROL, TOTAL: 301 MG/DL (ref 0–199)
CO2: 25 MMOL/L (ref 21–32)
CREAT SERPL-MCNC: 1.5 MG/DL (ref 0.6–1.1)
GFR AFRICAN AMERICAN: 43
GFR NON-AFRICAN AMERICAN: 36
GLUCOSE BLD-MCNC: 96 MG/DL (ref 70–99)
HDLC SERPL-MCNC: 46 MG/DL (ref 40–60)
LDL CHOLESTEROL CALCULATED: 210 MG/DL
POTASSIUM SERPL-SCNC: 5.9 MMOL/L (ref 3.5–5.1)
SODIUM BLD-SCNC: 145 MMOL/L (ref 136–145)
TRIGL SERPL-MCNC: 224 MG/DL (ref 0–150)
VLDLC SERPL CALC-MCNC: 45 MG/DL

## 2019-08-09 PROCEDURE — 36415 COLL VENOUS BLD VENIPUNCTURE: CPT | Performed by: FAMILY MEDICINE

## 2019-08-09 PROCEDURE — 99214 OFFICE O/P EST MOD 30 MIN: CPT | Performed by: FAMILY MEDICINE

## 2019-08-09 RX ORDER — AMLODIPINE BESYLATE 5 MG/1
5 TABLET ORAL DAILY
Qty: 90 TABLET | Refills: 3 | Status: SHIPPED | OUTPATIENT
Start: 2019-08-09 | End: 2019-12-13 | Stop reason: SDUPTHER

## 2019-08-09 RX ORDER — AMLODIPINE BESYLATE 5 MG/1
5 TABLET ORAL DAILY
Qty: 30 TABLET | Refills: 3 | Status: SHIPPED | OUTPATIENT
Start: 2019-08-09 | End: 2019-08-09 | Stop reason: SDUPTHER

## 2019-08-09 ASSESSMENT — ENCOUNTER SYMPTOMS
DIARRHEA: 0
NAUSEA: 0
CHEST TIGHTNESS: 0
ABDOMINAL PAIN: 0
SHORTNESS OF BREATH: 0
VOMITING: 0
WHEEZING: 0
COUGH: 0

## 2019-08-12 DIAGNOSIS — N18.30 CHRONIC KIDNEY DISEASE (CKD), ACTIVE MEDICAL MANAGEMENT WITHOUT DIALYSIS, STAGE 3 (MODERATE) (HCC): Primary | ICD-10-CM

## 2019-09-06 RX ORDER — METOPROLOL SUCCINATE 50 MG/1
TABLET, EXTENDED RELEASE ORAL
Qty: 180 TABLET | Refills: 0 | Status: ON HOLD | OUTPATIENT
Start: 2019-09-06 | End: 2019-12-02 | Stop reason: SDUPTHER

## 2019-09-09 ENCOUNTER — OFFICE VISIT (OUTPATIENT)
Dept: FAMILY MEDICINE CLINIC | Age: 54
End: 2019-09-09
Payer: COMMERCIAL

## 2019-09-09 VITALS
SYSTOLIC BLOOD PRESSURE: 130 MMHG | BODY MASS INDEX: 31.32 KG/M2 | WEIGHT: 188 LBS | DIASTOLIC BLOOD PRESSURE: 84 MMHG | HEIGHT: 65 IN

## 2019-09-09 DIAGNOSIS — N18.30 CHRONIC KIDNEY DISEASE (CKD), STAGE III (MODERATE) (HCC): ICD-10-CM

## 2019-09-09 DIAGNOSIS — I10 ESSENTIAL HYPERTENSION: Primary | ICD-10-CM

## 2019-09-09 DIAGNOSIS — R55 PRE-SYNCOPE: ICD-10-CM

## 2019-09-09 DIAGNOSIS — R42 LIGHTHEADEDNESS: ICD-10-CM

## 2019-09-09 DIAGNOSIS — R60.0 LEG EDEMA, RIGHT: ICD-10-CM

## 2019-09-09 PROCEDURE — 99213 OFFICE O/P EST LOW 20 MIN: CPT | Performed by: FAMILY MEDICINE

## 2019-09-09 RX ORDER — MIRTAZAPINE 30 MG/1
30 TABLET, FILM COATED ORAL DAILY
Refills: 1 | COMMUNITY
Start: 2019-09-06 | End: 2019-10-31

## 2019-09-09 ASSESSMENT — ENCOUNTER SYMPTOMS
SHORTNESS OF BREATH: 0
SORE THROAT: 0
RHINORRHEA: 0
ABDOMINAL PAIN: 0
VOMITING: 0
NAUSEA: 0
CHEST TIGHTNESS: 0
DIARRHEA: 0
WHEEZING: 0
TROUBLE SWALLOWING: 0
COUGH: 0
FACIAL SWELLING: 0
SINUS PRESSURE: 0

## 2019-09-09 ASSESSMENT — PATIENT HEALTH QUESTIONNAIRE - PHQ9
SUM OF ALL RESPONSES TO PHQ QUESTIONS 1-9: 2
SUM OF ALL RESPONSES TO PHQ QUESTIONS 1-9: 2
SUM OF ALL RESPONSES TO PHQ9 QUESTIONS 1 & 2: 2
2. FEELING DOWN, DEPRESSED OR HOPELESS: 1
1. LITTLE INTEREST OR PLEASURE IN DOING THINGS: 1

## 2019-09-09 NOTE — PROGRESS NOTES
2019     Marci Peterson (:  1965) is a 62 y.o. female, here for evaluation of the following medical concerns:      HTN- is on medication,  metpropolol 50 mg daily, hasn't been well controlled over the last month, has spikes in BP, has noticed that she will have sensation of lightheadedness and then check her BP which is found generally to be 130/80, she describes this as the BP medication \"kicking in\"? Denied dizziness with getting up, syncope, headaches, etc.. Renal function has always been up and down according to the patient. Pre-syncope- over the last year, maybe happening less but  Stronger, last for yuridia. 30 seconds, vison blurry, lightheadedness, denied hypoglycemia, no chest pain, no sob, doesn't pass out, feels very fatigued after these episodes, no convulsions, is aware of these as they are occurring. Has right leg edema, chronic. Chronic renal disease- again waxes and wanes but recently has been lower, creatinine was 1.5 and her GFR was 36, ordered a referral but patient hasn't made an appointment, I emphasized the importance of this, she understood and agreed.      Today, she denied chest pain, sob,n v, or diarrhea. HPI    Review of Systems   Constitutional: Negative for activity change, fatigue, fever and unexpected weight change. HENT: Negative for congestion, ear pain, facial swelling, mouth sores, rhinorrhea, sinus pressure, sore throat and trouble swallowing. Eyes: Positive for visual disturbance. Respiratory: Negative for cough, chest tightness, shortness of breath and wheezing. Cardiovascular: Positive for leg swelling. Negative for chest pain. Gastrointestinal: Negative for abdominal pain, diarrhea, nausea and vomiting. Endocrine: Negative for cold intolerance, heat intolerance, polydipsia and polyphagia. Genitourinary: Negative for dysuria, flank pain, frequency, hematuria, pelvic pain, urgency and vaginal bleeding.    Musculoskeletal: Positive

## 2019-09-12 ASSESSMENT — ENCOUNTER SYMPTOMS: BACK PAIN: 1

## 2019-09-16 ENCOUNTER — TELEPHONE (OUTPATIENT)
Dept: FAMILY MEDICINE CLINIC | Age: 54
End: 2019-09-16

## 2019-09-16 RX ORDER — AMOXICILLIN 500 MG/1
500 CAPSULE ORAL 2 TIMES DAILY
Qty: 14 CAPSULE | Refills: 0 | Status: SHIPPED | OUTPATIENT
Start: 2019-09-16 | End: 2019-09-23

## 2019-10-01 ENCOUNTER — OFFICE VISIT (OUTPATIENT)
Dept: GYNECOLOGY | Age: 54
End: 2019-10-01
Payer: COMMERCIAL

## 2019-10-01 VITALS
HEIGHT: 65 IN | DIASTOLIC BLOOD PRESSURE: 84 MMHG | OXYGEN SATURATION: 97 % | TEMPERATURE: 98 F | HEART RATE: 63 BPM | SYSTOLIC BLOOD PRESSURE: 120 MMHG | BODY MASS INDEX: 30.42 KG/M2 | RESPIRATION RATE: 16 BRPM | WEIGHT: 182.6 LBS

## 2019-10-01 DIAGNOSIS — Z01.411 ENCOUNTER FOR GYNECOLOGICAL EXAMINATION (GENERAL) (ROUTINE) WITH ABNORMAL FINDINGS: ICD-10-CM

## 2019-10-01 DIAGNOSIS — B97.7 HPV IN FEMALE: Primary | ICD-10-CM

## 2019-10-01 PROCEDURE — 99213 OFFICE O/P EST LOW 20 MIN: CPT | Performed by: OBSTETRICS & GYNECOLOGY

## 2019-10-01 RX ORDER — LAMOTRIGINE 25 MG/1
25 TABLET ORAL 3 TIMES DAILY
COMMUNITY
Start: 2019-09-09

## 2019-10-03 LAB
HPV COMMENT: NORMAL
HPV TYPE 16: NOT DETECTED
HPV TYPE 18: NOT DETECTED
HPVOH (OTHER TYPES): NOT DETECTED

## 2019-10-04 ENCOUNTER — TELEPHONE (OUTPATIENT)
Dept: FAMILY MEDICINE CLINIC | Age: 54
End: 2019-10-04

## 2019-10-17 ENCOUNTER — OFFICE VISIT (OUTPATIENT)
Dept: FAMILY MEDICINE CLINIC | Age: 54
End: 2019-10-17
Payer: COMMERCIAL

## 2019-10-17 VITALS
BODY MASS INDEX: 30.46 KG/M2 | HEIGHT: 65 IN | DIASTOLIC BLOOD PRESSURE: 80 MMHG | WEIGHT: 182.8 LBS | SYSTOLIC BLOOD PRESSURE: 120 MMHG

## 2019-10-17 DIAGNOSIS — N18.30 CHRONIC KIDNEY DISEASE (CKD), STAGE III (MODERATE) (HCC): ICD-10-CM

## 2019-10-17 DIAGNOSIS — I10 ESSENTIAL HYPERTENSION: ICD-10-CM

## 2019-10-17 DIAGNOSIS — R42 DIZZINESS: Primary | ICD-10-CM

## 2019-10-17 DIAGNOSIS — Z12.11 ENCOUNTER FOR FIT (FECAL IMMUNOCHEMICAL TEST) SCREENING: ICD-10-CM

## 2019-10-17 PROCEDURE — 99214 OFFICE O/P EST MOD 30 MIN: CPT | Performed by: FAMILY MEDICINE

## 2019-10-17 PROCEDURE — 93880 EXTRACRANIAL BILAT STUDY: CPT | Performed by: FAMILY MEDICINE

## 2019-10-17 ASSESSMENT — ENCOUNTER SYMPTOMS
SHORTNESS OF BREATH: 0
FACIAL SWELLING: 0
WHEEZING: 0
VOMITING: 0
CHEST TIGHTNESS: 0
TROUBLE SWALLOWING: 0
COUGH: 0
SINUS PRESSURE: 0
ABDOMINAL PAIN: 0
DIARRHEA: 0
RHINORRHEA: 0
NAUSEA: 0
SORE THROAT: 0

## 2019-10-17 ASSESSMENT — PATIENT HEALTH QUESTIONNAIRE - PHQ9
SUM OF ALL RESPONSES TO PHQ9 QUESTIONS 1 & 2: 0
SUM OF ALL RESPONSES TO PHQ QUESTIONS 1-9: 0
SUM OF ALL RESPONSES TO PHQ QUESTIONS 1-9: 0
2. FEELING DOWN, DEPRESSED OR HOPELESS: 0
1. LITTLE INTEREST OR PLEASURE IN DOING THINGS: 0

## 2019-10-20 ASSESSMENT — ENCOUNTER SYMPTOMS
COLOR CHANGE: 0
EYE DISCHARGE: 0

## 2019-10-25 ENCOUNTER — TELEPHONE (OUTPATIENT)
Dept: FAMILY MEDICINE CLINIC | Age: 54
End: 2019-10-25

## 2019-10-31 ENCOUNTER — TELEPHONE (OUTPATIENT)
Dept: FAMILY MEDICINE CLINIC | Age: 54
End: 2019-10-31

## 2019-10-31 ENCOUNTER — OFFICE VISIT (OUTPATIENT)
Dept: CARDIOLOGY CLINIC | Age: 54
End: 2019-10-31
Payer: COMMERCIAL

## 2019-10-31 VITALS
SYSTOLIC BLOOD PRESSURE: 96 MMHG | HEART RATE: 66 BPM | HEIGHT: 65 IN | WEIGHT: 183 LBS | DIASTOLIC BLOOD PRESSURE: 54 MMHG | BODY MASS INDEX: 30.49 KG/M2 | OXYGEN SATURATION: 98 %

## 2019-10-31 DIAGNOSIS — I10 ESSENTIAL HYPERTENSION: ICD-10-CM

## 2019-10-31 DIAGNOSIS — R25.1 SPELLS OF TREMBLING: Primary | ICD-10-CM

## 2019-10-31 DIAGNOSIS — I25.10 CORONARY ARTERY CALCIFICATION: ICD-10-CM

## 2019-10-31 DIAGNOSIS — I25.84 CORONARY ARTERY CALCIFICATION: ICD-10-CM

## 2019-10-31 DIAGNOSIS — I70.0 AORTIC ATHEROSCLEROSIS (HCC): ICD-10-CM

## 2019-10-31 PROCEDURE — 93000 ELECTROCARDIOGRAM COMPLETE: CPT | Performed by: INTERNAL MEDICINE

## 2019-10-31 PROCEDURE — 99204 OFFICE O/P NEW MOD 45 MIN: CPT | Performed by: INTERNAL MEDICINE

## 2019-10-31 RX ORDER — ATORVASTATIN CALCIUM 10 MG/1
10 TABLET, FILM COATED ORAL DAILY
Qty: 90 TABLET | Refills: 1 | Status: SHIPPED | OUTPATIENT
Start: 2019-10-31 | End: 2020-04-20

## 2019-11-20 RX ORDER — ACETAMINOPHEN 160 MG
TABLET,DISINTEGRATING ORAL
COMMUNITY

## 2019-11-20 RX ORDER — ASPIRIN 325 MG
325 TABLET ORAL DAILY
COMMUNITY

## 2019-11-20 RX ORDER — MIRTAZAPINE 30 MG/1
30 TABLET, FILM COATED ORAL NIGHTLY
COMMUNITY

## 2019-11-26 ENCOUNTER — ANESTHESIA EVENT (OUTPATIENT)
Dept: ENDOSCOPY | Age: 54
End: 2019-11-26
Payer: COMMERCIAL

## 2019-11-27 ENCOUNTER — TELEPHONE (OUTPATIENT)
Dept: FAMILY MEDICINE CLINIC | Age: 54
End: 2019-11-27

## 2019-12-02 ENCOUNTER — ANESTHESIA (OUTPATIENT)
Dept: ENDOSCOPY | Age: 54
End: 2019-12-02
Payer: COMMERCIAL

## 2019-12-02 ENCOUNTER — HOSPITAL ENCOUNTER (OUTPATIENT)
Age: 54
Setting detail: OUTPATIENT SURGERY
Discharge: HOME | End: 2019-12-02
Attending: INTERNAL MEDICINE | Admitting: INTERNAL MEDICINE
Payer: COMMERCIAL

## 2019-12-02 VITALS
RESPIRATION RATE: 16 BRPM | TEMPERATURE: 96.9 F | OXYGEN SATURATION: 98 % | WEIGHT: 181 LBS | SYSTOLIC BLOOD PRESSURE: 135 MMHG | BODY MASS INDEX: 29.09 KG/M2 | HEIGHT: 66 IN | HEART RATE: 56 BPM | DIASTOLIC BLOOD PRESSURE: 88 MMHG

## 2019-12-02 VITALS — DIASTOLIC BLOOD PRESSURE: 59 MMHG | SYSTOLIC BLOOD PRESSURE: 74 MMHG | OXYGEN SATURATION: 98 %

## 2019-12-02 DIAGNOSIS — Z12.11 SCREEN FOR COLON CANCER: ICD-10-CM

## 2019-12-02 PROCEDURE — 3700000001 HC ADD 15 MINUTES (ANESTHESIA): Performed by: INTERNAL MEDICINE

## 2019-12-02 PROCEDURE — 6360000002 HC RX W HCPCS: Performed by: NURSE ANESTHETIST, CERTIFIED REGISTERED

## 2019-12-02 PROCEDURE — 7100000010 HC PHASE II RECOVERY - FIRST 15 MIN: Performed by: INTERNAL MEDICINE

## 2019-12-02 PROCEDURE — 3609010600 HC COLONOSCOPY POLYPECTOMY SNARE/COLD BIOPSY: Performed by: INTERNAL MEDICINE

## 2019-12-02 PROCEDURE — 3700000000 HC ANESTHESIA ATTENDED CARE: Performed by: INTERNAL MEDICINE

## 2019-12-02 PROCEDURE — 88305 TISSUE EXAM BY PATHOLOGIST: CPT

## 2019-12-02 PROCEDURE — 7100000011 HC PHASE II RECOVERY - ADDTL 15 MIN: Performed by: INTERNAL MEDICINE

## 2019-12-02 PROCEDURE — 2709999900 HC NON-CHARGEABLE SUPPLY: Performed by: INTERNAL MEDICINE

## 2019-12-02 PROCEDURE — 2580000003 HC RX 258: Performed by: NURSE ANESTHETIST, CERTIFIED REGISTERED

## 2019-12-02 PROCEDURE — 2580000003 HC RX 258: Performed by: ANESTHESIOLOGY

## 2019-12-02 PROCEDURE — 2500000003 HC RX 250 WO HCPCS: Performed by: NURSE ANESTHETIST, CERTIFIED REGISTERED

## 2019-12-02 RX ORDER — SODIUM CHLORIDE 0.9 % (FLUSH) 0.9 %
10 SYRINGE (ML) INJECTION EVERY 12 HOURS SCHEDULED
Status: DISCONTINUED | OUTPATIENT
Start: 2019-12-02 | End: 2019-12-02 | Stop reason: HOSPADM

## 2019-12-02 RX ORDER — METOPROLOL SUCCINATE 50 MG/1
TABLET, EXTENDED RELEASE ORAL
Qty: 180 TABLET | Refills: 0 | Status: SHIPPED | OUTPATIENT
Start: 2019-12-02 | End: 2020-02-20

## 2019-12-02 RX ORDER — LIDOCAINE HYDROCHLORIDE 20 MG/ML
INJECTION, SOLUTION EPIDURAL; INFILTRATION; INTRACAUDAL; PERINEURAL PRN
Status: DISCONTINUED | OUTPATIENT
Start: 2019-12-02 | End: 2019-12-02 | Stop reason: SDUPTHER

## 2019-12-02 RX ORDER — SODIUM CHLORIDE 9 MG/ML
INJECTION, SOLUTION INTRAVENOUS CONTINUOUS
Status: DISCONTINUED | OUTPATIENT
Start: 2019-12-02 | End: 2019-12-02 | Stop reason: HOSPADM

## 2019-12-02 RX ORDER — SODIUM CHLORIDE 9 MG/ML
INJECTION, SOLUTION INTRAVENOUS CONTINUOUS PRN
Status: DISCONTINUED | OUTPATIENT
Start: 2019-12-02 | End: 2019-12-02 | Stop reason: SDUPTHER

## 2019-12-02 RX ORDER — SODIUM CHLORIDE 0.9 % (FLUSH) 0.9 %
10 SYRINGE (ML) INJECTION PRN
Status: DISCONTINUED | OUTPATIENT
Start: 2019-12-02 | End: 2019-12-02 | Stop reason: HOSPADM

## 2019-12-02 RX ORDER — PROPOFOL 10 MG/ML
INJECTION, EMULSION INTRAVENOUS PRN
Status: DISCONTINUED | OUTPATIENT
Start: 2019-12-02 | End: 2019-12-02 | Stop reason: SDUPTHER

## 2019-12-02 RX ADMIN — PROPOFOL 50 MG: 10 INJECTION, EMULSION INTRAVENOUS at 08:47

## 2019-12-02 RX ADMIN — SODIUM CHLORIDE: 0.9 INJECTION, SOLUTION INTRAVENOUS at 08:35

## 2019-12-02 RX ADMIN — PROPOFOL 50 MG: 10 INJECTION, EMULSION INTRAVENOUS at 08:51

## 2019-12-02 RX ADMIN — PROPOFOL 150 MG: 10 INJECTION, EMULSION INTRAVENOUS at 08:44

## 2019-12-02 RX ADMIN — SODIUM CHLORIDE: 9 INJECTION, SOLUTION INTRAVENOUS at 08:41

## 2019-12-02 RX ADMIN — LIDOCAINE HYDROCHLORIDE 50 MG: 20 INJECTION, SOLUTION EPIDURAL; INFILTRATION; INTRACAUDAL; PERINEURAL at 08:44

## 2019-12-02 ASSESSMENT — PAIN SCALES - GENERAL
PAINLEVEL_OUTOF10: 0

## 2019-12-02 ASSESSMENT — ENCOUNTER SYMPTOMS: SHORTNESS OF BREATH: 0

## 2019-12-02 ASSESSMENT — PAIN - FUNCTIONAL ASSESSMENT: PAIN_FUNCTIONAL_ASSESSMENT: 0-10

## 2019-12-09 ENCOUNTER — OFFICE VISIT (OUTPATIENT)
Dept: FAMILY MEDICINE CLINIC | Age: 54
End: 2019-12-09
Payer: COMMERCIAL

## 2019-12-09 VITALS
DIASTOLIC BLOOD PRESSURE: 82 MMHG | HEIGHT: 66 IN | BODY MASS INDEX: 30.05 KG/M2 | WEIGHT: 187 LBS | SYSTOLIC BLOOD PRESSURE: 138 MMHG

## 2019-12-09 DIAGNOSIS — R42 DIZZINESS: ICD-10-CM

## 2019-12-09 DIAGNOSIS — N18.30 CHRONIC KIDNEY DISEASE (CKD), STAGE III (MODERATE) (HCC): Primary | ICD-10-CM

## 2019-12-09 DIAGNOSIS — I10 ESSENTIAL HYPERTENSION: ICD-10-CM

## 2019-12-09 PROCEDURE — 99214 OFFICE O/P EST MOD 30 MIN: CPT | Performed by: FAMILY MEDICINE

## 2019-12-09 ASSESSMENT — ENCOUNTER SYMPTOMS
NAUSEA: 0
RHINORRHEA: 0
TROUBLE SWALLOWING: 0
DIARRHEA: 0
WHEEZING: 0
CHEST TIGHTNESS: 0
SORE THROAT: 0
VOMITING: 0
SHORTNESS OF BREATH: 0
ABDOMINAL PAIN: 0
SINUS PRESSURE: 0
COUGH: 0

## 2019-12-11 ASSESSMENT — ENCOUNTER SYMPTOMS
COLOR CHANGE: 0
BACK PAIN: 1

## 2019-12-12 ENCOUNTER — TELEPHONE (OUTPATIENT)
Dept: FAMILY MEDICINE CLINIC | Age: 54
End: 2019-12-12

## 2019-12-13 DIAGNOSIS — R42 DIZZINESS: Primary | ICD-10-CM

## 2019-12-13 RX ORDER — AMLODIPINE BESYLATE 5 MG/1
5 TABLET ORAL DAILY
Qty: 30 TABLET | Refills: 0 | Status: SHIPPED | OUTPATIENT
Start: 2019-12-13 | End: 2020-01-06

## 2020-01-06 RX ORDER — AMLODIPINE BESYLATE 5 MG/1
5 TABLET ORAL DAILY
Qty: 30 TABLET | Refills: 0 | Status: SHIPPED | OUTPATIENT
Start: 2020-01-06 | End: 2020-01-06

## 2020-01-06 RX ORDER — AMLODIPINE BESYLATE 5 MG/1
5 TABLET ORAL DAILY
Qty: 90 TABLET | Refills: 1 | Status: SHIPPED | OUTPATIENT
Start: 2020-01-06 | End: 2020-07-13

## 2020-01-12 NOTE — PROGRESS NOTES
2019     Marci Peterson (:  1965) is a 52 y.o. female, here for evaluation of the following medical concerns:    1. Bipolar 1 disorder- recently was evaluated by Psychiatrist and is trying to reduce her Diazepam medication. She has a follow up in one month. Tolerating this well. 2.  HTN- she is only on Toprol XL but recently her BP has been elevated at other doctors visits. Today, it is wnl in the clinic. She is not checking her BP at home, Denied dizziness, vision changes, or headaches. Today, she denied chest pain, sob,n,v, or diarrhea. HPI    Review of Systems   Constitutional: Positive for fatigue. Negative for activity change, fever and unexpected weight change. HENT: Negative for congestion, ear pain, facial swelling, mouth sores, rhinorrhea, sinus pressure and sore throat. Respiratory: Negative for cough, chest tightness, shortness of breath and wheezing. Cardiovascular: Negative for chest pain and leg swelling. Gastrointestinal: Negative for abdominal pain, diarrhea, nausea and vomiting. Musculoskeletal: Positive for back pain, neck pain and neck stiffness. Negative for arthralgias. Skin: Negative for rash. Neurological: Negative for dizziness, tremors, syncope, numbness and headaches. Psychiatric/Behavioral: Negative for suicidal ideas. The patient is not nervous/anxious. Prior to Visit Medications    Medication Sig Taking?  Authorizing Provider   MAGNESIUM PO Take by mouth Yes Historical Provider, MD   Riboflavin (B2 PO) Take by mouth Yes Historical Provider, MD   Coenzyme Q10 (CO Q 10 PO) Take by mouth Yes Historical Provider, MD   IRON PO Take by mouth Yes Historical Provider, MD   ACETAMINOPHEN-CAFFEINE PO Take by mouth Yes Historical Provider, MD   ibuprofen (ADVIL;MOTRIN) 800 MG tablet Take 800 mg by mouth every 6 hours as needed for Pain Yes Historical Provider, MD   MELOXICAM PO Take by mouth Yes Historical Provider, MD   SUMAtriptan Succinate (IMITREX PO) Take by mouth Yes Historical Provider, MD   oxyCODONE-acetaminophen (PERCOCET)  MG per tablet Take 1 tablet by mouth every 4 hours as needed for Pain . Yes Historical Provider, MD   metoprolol succinate (TOPROL XL) 50 MG extended release tablet Take 50 mg by mouth daily Two tablets Yes Historical Provider, MD   gabapentin (NEURONTIN) 600 MG tablet Take 800 mg by mouth 3 times daily  Yes Historical Provider, MD   diazepam (VALIUM) 5 MG tablet Take 5 mg by mouth 3 times daily  . Yes Historical Provider, MD   FLUoxetine (PROZAC) 10 MG capsule Take 40 mg by mouth 2 times daily  Yes Historical Provider, MD   Multiple Vitamins-Minerals (THERAPEUTIC MULTIVITAMIN-MINERALS) tablet Take 1 tablet by mouth daily. Yes Historical Provider, MD   b complex vitamins capsule Take 1 capsule by mouth daily  Historical Provider, MD   Cholecalciferol (VITAMIN D3) 90395 units CAPS Take 1 capsule by mouth once a week for 5 days  Jhon Valle MD        Social History     Tobacco Use    Smoking status: Former Smoker     Packs/day: 0.25     Years: 40.00     Pack years: 10.00     Types: Cigarettes     Last attempt to quit: 2016     Years since quittin.5    Smokeless tobacco: Never Used   Substance Use Topics    Alcohol use: Yes     Comment: rarely        Vitals:    19 1040 19 1103   BP: 110/82 122/80   Site:  Left Upper Arm   Position:  Sitting   Cuff Size:  Medium Adult   Weight: 182 lb (82.6 kg)    Height: 5' 5\" (1.651 m)      Estimated body mass index is 30.29 kg/m² as calculated from the following:    Height as of this encounter: 5' 5\" (1.651 m). Weight as of this encounter: 182 lb (82.6 kg). Physical Exam   Constitutional: She is oriented to person, place, and time. She appears well-developed and well-nourished. HENT:   Head: Normocephalic and atraumatic. Eyes: Conjunctivae are normal.   Neck: Normal range of motion.    Cardiovascular: Normal rate, regular rhythm and normal heart sounds. No murmur heard. Pulmonary/Chest: Effort normal and breath sounds normal. She has no wheezes. Abdominal: Soft. Bowel sounds are normal. There is no tenderness. Neurological: She is alert and oriented to person, place, and time. Psychiatric: She has a normal mood and affect. Her behavior is normal. Judgment and thought content normal.       ASSESSMENT/PLAN:  1. Bipolar 1 disorder ([address]  Stable  Continue with specialist.   Continue to reduce Valium. Keep follow up. 2. Essential hypertension  Difficult due to being normal in the clinic today   Discussed signs and symptoms for immediate evaluation in the ER. Reduce sodium. Monitor diet, exercise and lose weight. Keep a BP log and bring it to your next appointment. Very important, explained the need to have a log of BP readings everyday perhaps several times a day. Needs to rtc in one month for BP check      Return in about 1 month (around 6/24/2019) for Folllow up HTN. Patient is a 52y old  Male who presents with a chief complaint of SOB, was nc with salt anf fluid in office party    PAST MEDICAL & SURGICAL HISTORY:  CAD (coronary artery disease)  Hypertension  Diabetes 15 yrs  Dialysis patient - 8 yrs  Renal failure  AV fistula: Left arm      FAMILY HISTORY:  No pertinent family history in first degree relatives      Social History:   -smoke   - Alcohol   -    Drugs        REVIEW OF SYSTEMS:  CONSTITUTIONAL: No fever, weight loss, or fatigue  EYES: No eye pain, No visual disturbances,   RESPIRATORY: No cough, hemoptysis; + SOB  CARDIOVASCULAR: No chest pain, No palpitations,   mild leg swelling  GASTROINTESTINAL: No abdominal , NVD or GIB  GENITOURINARY: No UTI sx/hematuria  NEUROLOGICAL: No HA/wk/numbness  MUSCULOSKELETAL: No joint pain, No swelling      Vital Signs Last 24 Hrs  T(C): 36.6 (12 Jan 2020 11:10), Max: 37.1 (12 Jan 2020 02:45)  T(F): 97.9 (12 Jan 2020 11:10), Max: 98.7 (12 Jan 2020 02:45)  HR: 82 (12 Jan 2020 11:10) (79 - 99)  BP: 198/90 (12 Jan 2020 11:10) (177/79 - 220/98)  BP(mean): --  RR: 18 (12 Jan 2020 11:10) (18 - 24)  SpO2: 97% (12 Jan 2020 11:10) (87% - 97%)    PHYSICAL EXAM:    GENERAL: NAD,   EYES:  conjunctiva and sclera clear  NECK: Supple, No JVD/Carotid Bruit -ve   NERVOUS SYSTEM:  A/O x3,   Lungs : fewost rales, No rhonchi,   HEART:  No murmur,  No rub, No gallops  ABDOMEN: Soft, NT/ND BS+  EXTREMITIES:  + Peripheral Pulses, - edema  SKIN: No rashes or lesions      LABS:                        9.5    5.00  )-----------( 144      ( 12 Jan 2020 04:08 )             28.8     01-12    142  |  100  |  57.0<H>  ----------------------------<  217<H>  6.5<HH>   |  26.0  |  9.43<H>    Ca    9.1      12 Jan 2020 04:08    TPro  6.5<L>  /  Alb  4.1  /  TBili  0.4  /  DBili  x   /  AST  32  /  ALT  27  /  AlkPhos  92  01-12    PT/INR - ( 12 Jan 2020 04:08 )   PT: 11.0 sec;   INR: 0.96 ratio         PTT - ( 12 Jan 2020 04:08 )  PTT:34.1 sec    RADIOLOGY & ADDITIONAL TESTS: cxr - mild mansi    MEDICATIONS  (STANDING):  acetaminophen   Tablet .. PRN  amLODIPine   Tablet  aspirin enteric coated  atorvastatin  calcium acetate  folic acid  furosemide    Tablet  guaiFENesin   Syrup  (Sugar-Free) PRN  heparin  Injectable  hydrALAZINE  hydrALAZINE Injectable PRN  losartan  metoprolol succinate ER  tamsulosin  ticagrelor

## 2020-02-20 RX ORDER — METOPROLOL SUCCINATE 50 MG/1
TABLET, EXTENDED RELEASE ORAL
Qty: 180 TABLET | Refills: 0 | Status: SHIPPED | OUTPATIENT
Start: 2020-02-20 | End: 2020-04-13

## 2020-03-09 ENCOUNTER — OFFICE VISIT (OUTPATIENT)
Dept: FAMILY MEDICINE CLINIC | Age: 55
End: 2020-03-09
Payer: COMMERCIAL

## 2020-03-09 VITALS
WEIGHT: 181 LBS | HEIGHT: 66 IN | DIASTOLIC BLOOD PRESSURE: 84 MMHG | BODY MASS INDEX: 29.09 KG/M2 | SYSTOLIC BLOOD PRESSURE: 132 MMHG

## 2020-03-09 LAB
ANION GAP SERPL CALCULATED.3IONS-SCNC: 14 MMOL/L (ref 3–16)
BUN BLDV-MCNC: 22 MG/DL (ref 7–20)
CALCIUM SERPL-MCNC: 9.7 MG/DL (ref 8.3–10.6)
CHLORIDE BLD-SCNC: 107 MMOL/L (ref 99–110)
CO2: 21 MMOL/L (ref 21–32)
CREAT SERPL-MCNC: 1.2 MG/DL (ref 0.6–1.1)
GFR AFRICAN AMERICAN: 56
GFR NON-AFRICAN AMERICAN: 46
GLUCOSE BLD-MCNC: 80 MG/DL (ref 70–99)
HCT VFR BLD CALC: 38.8 % (ref 36–48)
HEMOGLOBIN: 12.7 G/DL (ref 12–16)
MCH RBC QN AUTO: 28.9 PG (ref 26–34)
MCHC RBC AUTO-ENTMCNC: 32.8 G/DL (ref 31–36)
MCV RBC AUTO: 88 FL (ref 80–100)
PDW BLD-RTO: 15.5 % (ref 12.4–15.4)
PLATELET # BLD: 335 K/UL (ref 135–450)
PMV BLD AUTO: 8.7 FL (ref 5–10.5)
POTASSIUM SERPL-SCNC: 5 MMOL/L (ref 3.5–5.1)
RBC # BLD: 4.41 M/UL (ref 4–5.2)
SODIUM BLD-SCNC: 142 MMOL/L (ref 136–145)
WBC # BLD: 8.7 K/UL (ref 4–11)

## 2020-03-09 PROCEDURE — 99214 OFFICE O/P EST MOD 30 MIN: CPT | Performed by: FAMILY MEDICINE

## 2020-03-09 PROCEDURE — 36415 COLL VENOUS BLD VENIPUNCTURE: CPT | Performed by: FAMILY MEDICINE

## 2020-03-09 RX ORDER — HYDROXYZINE HYDROCHLORIDE 10 MG/1
10 TABLET, FILM COATED ORAL 3 TIMES DAILY
COMMUNITY
Start: 2020-02-28

## 2020-03-09 ASSESSMENT — ENCOUNTER SYMPTOMS
WHEEZING: 0
VOMITING: 0
ABDOMINAL PAIN: 0
NAUSEA: 0
RHINORRHEA: 0
SORE THROAT: 0
CHEST TIGHTNESS: 0
DIARRHEA: 0
COUGH: 0
TROUBLE SWALLOWING: 0
SINUS PRESSURE: 0
SHORTNESS OF BREATH: 0

## 2020-03-09 NOTE — PROGRESS NOTES
and no etiology can be found. Is on a number of medications secondary to her Bipolar disorder which could be the cause, but is unable to safely go off of these at this time. Her emotional state is well controlled at this time.      4.  HTN- controlled today, has had issues with BP being poorly controlled, denied side effects from the medication, Nephrologist lowered her Norvasc to 5 mg daily, no headache or vision change.      5. Chronic kidney diease III- was evaluated by her Nephrologist last week, several medication changes have occurred, still having her symptoms.     Today, denied chest pain, sob, n, v,or diarrhea. HPI    Review of Systems   Constitutional: Positive for fatigue. Negative for activity change, fever and unexpected weight change. HENT: Negative for congestion, ear pain, mouth sores, rhinorrhea, sinus pressure, sore throat and trouble swallowing. Eyes: Negative for visual disturbance. Respiratory: Negative for cough, chest tightness, shortness of breath and wheezing. Cardiovascular: Negative for chest pain and leg swelling. Gastrointestinal: Negative for abdominal pain, diarrhea, nausea and vomiting. Genitourinary: Negative for vaginal bleeding and vaginal discharge. Musculoskeletal: Positive for arthralgias, back pain, gait problem, joint swelling, myalgias, neck pain and neck stiffness. Skin: Positive for color change and wound. Negative for rash. Neurological: Positive for light-headedness. Negative for dizziness, syncope, numbness and headaches. Psychiatric/Behavioral: Positive for agitation and dysphoric mood. Negative for suicidal ideas. The patient is nervous/anxious. Prior to Visit Medications    Medication Sig Taking?  Authorizing Provider   hydrOXYzine (ATARAX) 10 MG tablet Take 10 mg by mouth 3 times daily Yes Historical Provider, MD   metoprolol succinate (TOPROL XL) 50 MG extended release tablet TAKE 2 TABLETS BY MOUTH EVERY DAY Yes Emmanuel Cook, DO mirtazapine (REMERON) 30 MG tablet Take 30 mg by mouth nightly Yes Historical Provider, MD   aspirin 325 MG tablet Take 325 mg by mouth daily Yes Historical Provider, MD   atorvastatin (LIPITOR) 10 MG tablet Take 1 tablet by mouth daily Yes Garrett Caraballo MD   lamoTRIgine (LAMICTAL) 25 MG tablet 25 mg 3 times daily  Yes Historical Provider, MD   Blood Pressure KIT Test blood pressure twice daily  (please provide any unit on patients insurance formulary) Yes Ana Nice DO   b complex vitamins capsule Take 1 capsule by mouth daily Yes Historical Provider, MD   IRON PO Take by mouth Yes Historical Provider, MD   oxyCODONE-acetaminophen (PERCOCET)  MG per tablet Take 1 tablet by mouth every 4 hours as needed for Pain . Yes Historical Provider, MD   gabapentin (NEURONTIN) 600 MG tablet Take 800 mg by mouth 3 times daily  Yes Historical Provider, MD   FLUoxetine (PROZAC) 10 MG capsule Take 40 mg by mouth 2 times daily  Yes Historical Provider, MD   Multiple Vitamins-Minerals (THERAPEUTIC MULTIVITAMIN-MINERALS) tablet Take 1 tablet by mouth daily.  Yes Historical Provider, MD   amLODIPine (NORVASC) 5 MG tablet TAKE 1 TABLET BY MOUTH DAILY  Patient not taking: Reported on 3/9/2020  Ana Nice DO   Cholecalciferol (VITAMIN D3) 50 MCG (2000 UT) CAPS Take by mouth  Historical Provider, MD   MAGNESIUM PO Take by mouth  Historical Provider, MD   Coenzyme Q10 (CO Q 10 PO) Take by mouth  Historical Provider, MD        Social History     Tobacco Use    Smoking status: Former Smoker     Packs/day: 0.25     Years: 40.00     Pack years: 10.00     Types: Cigarettes     Last attempt to quit: 11/13/2016     Years since quitting: 3.3    Smokeless tobacco: Never Used   Substance Use Topics    Alcohol use: Yes     Comment: rarely        Vitals:    03/09/20 0952   BP: 132/84   Weight: 181 lb (82.1 kg)   Height: 5' 5.5\" (1.664 m)     Estimated body mass index is 29.66 kg/m² as calculated from the following:    Height as Panel  - CBC    2. Bipolar 1 disorder ([facility] [address]  Stable  Continue with medication  Keep appointments with specialist.   Answered questions  - Basic Metabolic Panel  - CBC    3. Injury of right shoulder, sequela  Stable  Continue with medication  Keep appointments with specialist.   Answered questions  - Basic Metabolic Panel  - CBC    4. Chronic kidney disease (CKD), stage III (moderate) (HCC)  Stable  Continue with medication  Keep appointments with specialist.   Answered questions  - Basic Metabolic Panel  - CBC    5. Degeneration of cervical intervertebral disc  Stable  Continue with medication  Keep appointments with specialist.   Eialan Bethel questions  Reassured the patient  Very upset in the room will try to talk with specialist.       Return in about 3 months (around 6/9/2020).

## 2020-03-12 ASSESSMENT — ENCOUNTER SYMPTOMS
BACK PAIN: 1
COLOR CHANGE: 1

## 2020-03-27 ENCOUNTER — TELEPHONE (OUTPATIENT)
Dept: FAMILY MEDICINE CLINIC | Age: 55
End: 2020-03-27

## 2020-04-13 RX ORDER — METOPROLOL SUCCINATE 50 MG/1
TABLET, EXTENDED RELEASE ORAL
Qty: 180 TABLET | Refills: 0 | Status: SHIPPED | OUTPATIENT
Start: 2020-04-13 | End: 2020-11-23 | Stop reason: SDUPTHER

## 2020-04-20 RX ORDER — ATORVASTATIN CALCIUM 10 MG/1
10 TABLET, FILM COATED ORAL DAILY
Qty: 90 TABLET | Refills: 1 | Status: SHIPPED | OUTPATIENT
Start: 2020-04-20 | End: 2020-10-12 | Stop reason: SDUPTHER

## 2020-06-08 ENCOUNTER — TELEPHONE (OUTPATIENT)
Dept: GYNECOLOGY | Age: 55
End: 2020-06-08

## 2020-06-08 NOTE — TELEPHONE ENCOUNTER
Tried to call pt and mailbox full was calling because has gotten no response from telephone messages and letter so sending another letter today.

## 2020-07-13 RX ORDER — AMLODIPINE BESYLATE 5 MG/1
5 TABLET ORAL DAILY
Qty: 90 TABLET | Refills: 1 | Status: SHIPPED | OUTPATIENT
Start: 2020-07-13 | End: 2021-04-12

## 2020-10-12 RX ORDER — ATORVASTATIN CALCIUM 10 MG/1
10 TABLET, FILM COATED ORAL DAILY
Qty: 90 TABLET | Refills: 1 | Status: SHIPPED | OUTPATIENT
Start: 2020-10-12

## 2020-11-23 RX ORDER — METOPROLOL SUCCINATE 50 MG/1
TABLET, EXTENDED RELEASE ORAL
Qty: 180 TABLET | Refills: 0 | Status: SHIPPED | OUTPATIENT
Start: 2020-11-23 | End: 2020-11-27 | Stop reason: SDUPTHER

## 2020-11-23 RX ORDER — METOPROLOL SUCCINATE 50 MG/1
TABLET, EXTENDED RELEASE ORAL
Qty: 180 TABLET | Refills: 0 | Status: SHIPPED | OUTPATIENT
Start: 2020-11-23 | End: 2020-11-23 | Stop reason: SDUPTHER

## 2020-11-23 RX ORDER — METOPROLOL SUCCINATE 50 MG/1
TABLET, EXTENDED RELEASE ORAL
Qty: 180 TABLET | Refills: 0 | Status: CANCELLED | OUTPATIENT
Start: 2020-11-23

## 2020-11-27 RX ORDER — METOPROLOL SUCCINATE 50 MG/1
TABLET, EXTENDED RELEASE ORAL
Qty: 180 TABLET | Refills: 0 | Status: SHIPPED | OUTPATIENT
Start: 2020-11-27 | End: 2020-12-01 | Stop reason: SDUPTHER

## 2020-12-01 RX ORDER — METOPROLOL SUCCINATE 50 MG/1
TABLET, EXTENDED RELEASE ORAL
Qty: 180 TABLET | Refills: 1 | Status: SHIPPED | OUTPATIENT
Start: 2020-12-01 | End: 2020-12-01 | Stop reason: SDUPTHER

## 2020-12-01 RX ORDER — METOPROLOL SUCCINATE 50 MG/1
TABLET, EXTENDED RELEASE ORAL
Qty: 180 TABLET | Refills: 1 | Status: SHIPPED | OUTPATIENT
Start: 2020-12-01 | End: 2021-09-09

## 2021-04-12 RX ORDER — AMLODIPINE BESYLATE 5 MG/1
5 TABLET ORAL DAILY
Qty: 90 TABLET | Refills: 1 | Status: SHIPPED | OUTPATIENT
Start: 2021-04-12

## 2021-06-15 ENCOUNTER — CLINICAL DOCUMENTATION (OUTPATIENT)
Dept: OTHER | Age: 56
End: 2021-06-15

## 2021-09-09 DIAGNOSIS — I10 ESSENTIAL HYPERTENSION: ICD-10-CM

## 2021-09-09 RX ORDER — METOPROLOL SUCCINATE 50 MG/1
TABLET, EXTENDED RELEASE ORAL
Qty: 180 TABLET | Refills: 1 | Status: SHIPPED | OUTPATIENT
Start: 2021-09-09

## 2022-03-08 DIAGNOSIS — I10 ESSENTIAL HYPERTENSION: ICD-10-CM

## 2022-03-08 RX ORDER — METOPROLOL SUCCINATE 50 MG/1
TABLET, EXTENDED RELEASE ORAL
Qty: 180 TABLET | Refills: 1 | OUTPATIENT
Start: 2022-03-08

## 2022-03-08 NOTE — TELEPHONE ENCOUNTER
Last office visit 3/9/2020     Last written     Next office visit scheduled Visit date not found    Requested Prescriptions     Pending Prescriptions Disp Refills    metoprolol succinate (TOPROL XL) 50 MG extended release tablet [Pharmacy Med Name: METOPROLOL ER SUCCINATE 50MG TABS] 180 tablet 1     Sig: TAKE 2 TABLETS BY MOUTH EVERY DAY

## 2024-05-11 ENCOUNTER — HOSPITAL ENCOUNTER (EMERGENCY)
Age: 59
Discharge: ANOTHER HOSPITAL | End: 2024-05-12
Attending: EMERGENCY MEDICINE
Payer: MEDICARE

## 2024-05-11 ENCOUNTER — APPOINTMENT (OUTPATIENT)
Dept: CT IMAGING | Age: 59
End: 2024-05-11
Payer: MEDICARE

## 2024-05-11 ENCOUNTER — APPOINTMENT (OUTPATIENT)
Dept: GENERAL RADIOLOGY | Age: 59
End: 2024-05-11
Payer: MEDICARE

## 2024-05-11 DIAGNOSIS — R07.89 ATYPICAL CHEST PAIN: ICD-10-CM

## 2024-05-11 DIAGNOSIS — J18.9 PNEUMONIA DUE TO INFECTIOUS ORGANISM, UNSPECIFIED LATERALITY, UNSPECIFIED PART OF LUNG: Primary | ICD-10-CM

## 2024-05-11 LAB
ALBUMIN SERPL-MCNC: 3.4 G/DL (ref 3.4–5)
ALBUMIN/GLOB SERPL: 1.2 {RATIO} (ref 1.1–2.2)
ALP SERPL-CCNC: 153 U/L (ref 40–129)
ALT SERPL-CCNC: <5 U/L (ref 10–40)
ANION GAP SERPL CALCULATED.3IONS-SCNC: 17 MMOL/L (ref 3–16)
AST SERPL-CCNC: 13 U/L (ref 15–37)
BASE EXCESS BLDV CALC-SCNC: 2.4 MMOL/L (ref -3–3)
BASOPHILS # BLD: 0.1 K/UL (ref 0–0.2)
BASOPHILS NFR BLD: 1 %
BILIRUB SERPL-MCNC: 0.3 MG/DL (ref 0–1)
BUN SERPL-MCNC: 6 MG/DL (ref 7–20)
CALCIUM SERPL-MCNC: 9 MG/DL (ref 8.3–10.6)
CHLORIDE SERPL-SCNC: 101 MMOL/L (ref 99–110)
CO2 BLDV-SCNC: 23 MMOL/L
CO2 SERPL-SCNC: 20 MMOL/L (ref 21–32)
CREAT SERPL-MCNC: 0.6 MG/DL (ref 0.6–1.2)
D DIMER: 2.38 UG/ML FEU (ref 0–0.6)
DEPRECATED RDW RBC AUTO: 15.5 % (ref 12.4–15.4)
EKG ATRIAL RATE: 85 BPM
EKG DIAGNOSIS: NORMAL
EKG P AXIS: 14 DEGREES
EKG P-R INTERVAL: 128 MS
EKG Q-T INTERVAL: 386 MS
EKG QRS DURATION: 86 MS
EKG QTC CALCULATION (BAZETT): 459 MS
EKG R AXIS: 9 DEGREES
EKG T AXIS: 35 DEGREES
EKG VENTRICULAR RATE: 85 BPM
EOSINOPHIL # BLD: 0.1 K/UL (ref 0–0.6)
EOSINOPHIL NFR BLD: 0.5 %
GFR SERPLBLD CREATININE-BSD FMLA CKD-EPI: >90 ML/MIN/{1.73_M2}
GLUCOSE SERPL-MCNC: 115 MG/DL (ref 70–99)
HCO3 BLDV-SCNC: 24.1 MMOL/L (ref 23–29)
HCT VFR BLD AUTO: 33.2 % (ref 36–48)
HGB BLD-MCNC: 10.9 G/DL (ref 12–16)
LYMPHOCYTES # BLD: 1.6 K/UL (ref 1–5.1)
LYMPHOCYTES NFR BLD: 14.4 %
MCH RBC QN AUTO: 27.7 PG (ref 26–34)
MCHC RBC AUTO-ENTMCNC: 32.9 G/DL (ref 31–36)
MCV RBC AUTO: 84.2 FL (ref 80–100)
MONOCYTES # BLD: 0.8 K/UL (ref 0–1.3)
MONOCYTES NFR BLD: 7.4 %
NEUTROPHILS # BLD: 8.7 K/UL (ref 1.7–7.7)
NEUTROPHILS NFR BLD: 76.7 %
NT-PROBNP SERPL-MCNC: 1107 PG/ML (ref 0–124)
O2 THERAPY: ABNORMAL
PCO2 BLDV: 25.2 MMHG (ref 40–50)
PH BLDV: 7.59 [PH] (ref 7.35–7.45)
PLATELET # BLD AUTO: 614 K/UL (ref 135–450)
PMV BLD AUTO: 7 FL (ref 5–10.5)
PO2 BLDV: 55.1 MMHG (ref 25–40)
POTASSIUM SERPL-SCNC: 3.7 MMOL/L (ref 3.5–5.1)
PROT SERPL-MCNC: 6.3 G/DL (ref 6.4–8.2)
RBC # BLD AUTO: 3.94 M/UL (ref 4–5.2)
SAO2 % BLDV: 93 %
SARS-COV-2 RDRP RESP QL NAA+PROBE: NOT DETECTED
SODIUM SERPL-SCNC: 138 MMOL/L (ref 136–145)
TROPONIN, HIGH SENSITIVITY: 22 NG/L (ref 0–14)
TROPONIN, HIGH SENSITIVITY: 22 NG/L (ref 0–14)
WBC # BLD AUTO: 11.3 K/UL (ref 4–11)

## 2024-05-11 PROCEDURE — 99285 EMERGENCY DEPT VISIT HI MDM: CPT

## 2024-05-11 PROCEDURE — 71045 X-RAY EXAM CHEST 1 VIEW: CPT

## 2024-05-11 PROCEDURE — 6370000000 HC RX 637 (ALT 250 FOR IP): Performed by: EMERGENCY MEDICINE

## 2024-05-11 PROCEDURE — 87635 SARS-COV-2 COVID-19 AMP PRB: CPT

## 2024-05-11 PROCEDURE — 85025 COMPLETE CBC W/AUTO DIFF WBC: CPT

## 2024-05-11 PROCEDURE — 85379 FIBRIN DEGRADATION QUANT: CPT

## 2024-05-11 PROCEDURE — 96365 THER/PROPH/DIAG IV INF INIT: CPT

## 2024-05-11 PROCEDURE — 71260 CT THORAX DX C+: CPT

## 2024-05-11 PROCEDURE — 80053 COMPREHEN METABOLIC PANEL: CPT

## 2024-05-11 PROCEDURE — 82803 BLOOD GASES ANY COMBINATION: CPT

## 2024-05-11 PROCEDURE — 2580000003 HC RX 258: Performed by: EMERGENCY MEDICINE

## 2024-05-11 PROCEDURE — 96366 THER/PROPH/DIAG IV INF ADDON: CPT

## 2024-05-11 PROCEDURE — 6360000002 HC RX W HCPCS: Performed by: EMERGENCY MEDICINE

## 2024-05-11 PROCEDURE — 83880 ASSAY OF NATRIURETIC PEPTIDE: CPT

## 2024-05-11 PROCEDURE — 6360000004 HC RX CONTRAST MEDICATION: Performed by: EMERGENCY MEDICINE

## 2024-05-11 PROCEDURE — 96367 TX/PROPH/DG ADDL SEQ IV INF: CPT

## 2024-05-11 PROCEDURE — 84484 ASSAY OF TROPONIN QUANT: CPT

## 2024-05-11 PROCEDURE — 36415 COLL VENOUS BLD VENIPUNCTURE: CPT

## 2024-05-11 RX ORDER — OXYCODONE HYDROCHLORIDE AND ACETAMINOPHEN 5; 325 MG/1; MG/1
2 TABLET ORAL ONCE
Status: COMPLETED | OUTPATIENT
Start: 2024-05-11 | End: 2024-05-11

## 2024-05-11 RX ORDER — ACETAMINOPHEN 325 MG/1
325 TABLET ORAL ONCE
Status: COMPLETED | OUTPATIENT
Start: 2024-05-11 | End: 2024-05-11

## 2024-05-11 RX ORDER — AZITHROMYCIN 250 MG/1
250 TABLET, FILM COATED ORAL ONCE
Status: COMPLETED | OUTPATIENT
Start: 2024-05-11 | End: 2024-05-11

## 2024-05-11 RX ADMIN — AZITHROMYCIN 250 MG: 250 TABLET, FILM COATED ORAL at 21:43

## 2024-05-11 RX ADMIN — PIPERACILLIN AND TAZOBACTAM 3375 MG: 3; .375 INJECTION, POWDER, FOR SOLUTION INTRAVENOUS at 21:50

## 2024-05-11 RX ADMIN — ACETAMINOPHEN 325MG 325 MG: 325 TABLET ORAL at 23:20

## 2024-05-11 RX ADMIN — OXYCODONE HYDROCHLORIDE AND ACETAMINOPHEN 2 TABLET: 5; 325 TABLET ORAL at 20:01

## 2024-05-11 RX ADMIN — VANCOMYCIN HYDROCHLORIDE 2250 MG: 1 INJECTION, POWDER, LYOPHILIZED, FOR SOLUTION INTRAVENOUS at 22:51

## 2024-05-11 RX ADMIN — IOMEPROL INJECTION 100 ML: 714 INJECTION, SOLUTION INTRAVASCULAR at 20:03

## 2024-05-11 ASSESSMENT — PAIN SCALES - GENERAL
PAINLEVEL_OUTOF10: 5
PAINLEVEL_OUTOF10: 7
PAINLEVEL_OUTOF10: 10
PAINLEVEL_OUTOF10: 10

## 2024-05-11 ASSESSMENT — PAIN - FUNCTIONAL ASSESSMENT
PAIN_FUNCTIONAL_ASSESSMENT: 0-10
PAIN_FUNCTIONAL_ASSESSMENT: 0-10

## 2024-05-11 ASSESSMENT — PAIN DESCRIPTION - LOCATION
LOCATION: GENERALIZED;CHEST
LOCATION: HEAD

## 2024-05-11 ASSESSMENT — HEART SCORE: ECG: NON-SPECIFC REPOLARIZATION DISTURBANCE/LBTB/PM

## 2024-05-12 ENCOUNTER — HOSPITAL ENCOUNTER (INPATIENT)
Age: 59
LOS: 1 days | Discharge: HOME HEALTH | DRG: 194 | End: 2024-05-13
Attending: STUDENT IN AN ORGANIZED HEALTH CARE EDUCATION/TRAINING PROGRAM | Admitting: INTERNAL MEDICINE
Payer: MEDICARE

## 2024-05-12 VITALS
WEIGHT: 188.05 LBS | RESPIRATION RATE: 18 BRPM | SYSTOLIC BLOOD PRESSURE: 150 MMHG | HEART RATE: 75 BPM | DIASTOLIC BLOOD PRESSURE: 75 MMHG | BODY MASS INDEX: 32.11 KG/M2 | TEMPERATURE: 98 F | OXYGEN SATURATION: 98 % | HEIGHT: 64 IN

## 2024-05-12 PROBLEM — R07.9 CHEST PAIN: Status: ACTIVE | Noted: 2024-05-12

## 2024-05-12 PROBLEM — R06.00 DYSPNEA: Status: ACTIVE | Noted: 2024-05-12

## 2024-05-12 LAB
ALBUMIN SERPL-MCNC: 3.8 G/DL (ref 3.4–5)
ALBUMIN/GLOB SERPL: 1.2 {RATIO} (ref 1.1–2.2)
ALP SERPL-CCNC: 183 U/L (ref 40–129)
ALT SERPL-CCNC: 10 U/L (ref 10–40)
ANION GAP SERPL CALCULATED.3IONS-SCNC: 17 MMOL/L (ref 3–16)
AST SERPL-CCNC: 19 U/L (ref 15–37)
BASOPHILS # BLD: 0.1 K/UL (ref 0–0.2)
BASOPHILS NFR BLD: 0.9 %
BILIRUB SERPL-MCNC: <0.2 MG/DL (ref 0–1)
BUN SERPL-MCNC: 4 MG/DL (ref 7–20)
CALCIUM SERPL-MCNC: 9.4 MG/DL (ref 8.3–10.6)
CHLORIDE SERPL-SCNC: 101 MMOL/L (ref 99–110)
CO2 SERPL-SCNC: 21 MMOL/L (ref 21–32)
CREAT SERPL-MCNC: 0.7 MG/DL (ref 0.6–1.2)
DEPRECATED RDW RBC AUTO: 15.5 % (ref 12.4–15.4)
EOSINOPHIL # BLD: 0 K/UL (ref 0–0.6)
EOSINOPHIL NFR BLD: 0.3 %
GFR SERPLBLD CREATININE-BSD FMLA CKD-EPI: >90 ML/MIN/{1.73_M2}
GLUCOSE SERPL-MCNC: 69 MG/DL (ref 70–99)
HCT VFR BLD AUTO: 38.3 % (ref 36–48)
HGB BLD-MCNC: 12.3 G/DL (ref 12–16)
LYMPHOCYTES # BLD: 2.1 K/UL (ref 1–5.1)
LYMPHOCYTES NFR BLD: 16.8 %
MCH RBC QN AUTO: 27.4 PG (ref 26–34)
MCHC RBC AUTO-ENTMCNC: 32.3 G/DL (ref 31–36)
MCV RBC AUTO: 85 FL (ref 80–100)
MONOCYTES # BLD: 1 K/UL (ref 0–1.3)
MONOCYTES NFR BLD: 7.8 %
NEUTROPHILS # BLD: 9.3 K/UL (ref 1.7–7.7)
NEUTROPHILS NFR BLD: 74.2 %
PLATELET # BLD AUTO: 569 K/UL (ref 135–450)
PMV BLD AUTO: 7.5 FL (ref 5–10.5)
POTASSIUM SERPL-SCNC: 3.6 MMOL/L (ref 3.5–5.1)
PROCALCITONIN SERPL IA-MCNC: 0.07 NG/ML (ref 0–0.15)
PROT SERPL-MCNC: 7.1 G/DL (ref 6.4–8.2)
RBC # BLD AUTO: 4.5 M/UL (ref 4–5.2)
SODIUM SERPL-SCNC: 139 MMOL/L (ref 136–145)
TROPONIN, HIGH SENSITIVITY: 25 NG/L (ref 0–14)
WBC # BLD AUTO: 12.5 K/UL (ref 4–11)

## 2024-05-12 PROCEDURE — 6360000002 HC RX W HCPCS: Performed by: INTERNAL MEDICINE

## 2024-05-12 PROCEDURE — 2580000003 HC RX 258: Performed by: INTERNAL MEDICINE

## 2024-05-12 PROCEDURE — 36415 COLL VENOUS BLD VENIPUNCTURE: CPT

## 2024-05-12 PROCEDURE — 96366 THER/PROPH/DIAG IV INF ADDON: CPT

## 2024-05-12 PROCEDURE — 84145 PROCALCITONIN (PCT): CPT

## 2024-05-12 PROCEDURE — 6360000002 HC RX W HCPCS: Performed by: EMERGENCY MEDICINE

## 2024-05-12 PROCEDURE — 6370000000 HC RX 637 (ALT 250 FOR IP): Performed by: INTERNAL MEDICINE

## 2024-05-12 PROCEDURE — 84484 ASSAY OF TROPONIN QUANT: CPT

## 2024-05-12 PROCEDURE — 85025 COMPLETE CBC W/AUTO DIFF WBC: CPT

## 2024-05-12 PROCEDURE — 80053 COMPREHEN METABOLIC PANEL: CPT

## 2024-05-12 PROCEDURE — 1200000000 HC SEMI PRIVATE

## 2024-05-12 RX ORDER — GALCANEZUMAB 120 MG/ML
120 INJECTION, SOLUTION SUBCUTANEOUS
COMMUNITY

## 2024-05-12 RX ORDER — GUAIFENESIN/DEXTROMETHORPHAN 100-10MG/5
5 SYRUP ORAL EVERY 4 HOURS PRN
Status: DISCONTINUED | OUTPATIENT
Start: 2024-05-12 | End: 2024-05-13 | Stop reason: HOSPADM

## 2024-05-12 RX ORDER — ATORVASTATIN CALCIUM 40 MG/1
40 TABLET, FILM COATED ORAL
Status: DISCONTINUED | OUTPATIENT
Start: 2024-05-12 | End: 2024-05-13 | Stop reason: HOSPADM

## 2024-05-12 RX ORDER — SENNA AND DOCUSATE SODIUM 50; 8.6 MG/1; MG/1
1 TABLET, FILM COATED ORAL 2 TIMES DAILY
COMMUNITY

## 2024-05-12 RX ORDER — OXYCODONE AND ACETAMINOPHEN 10; 325 MG/1; MG/1
1 TABLET ORAL EVERY 6 HOURS PRN
Status: DISCONTINUED | OUTPATIENT
Start: 2024-05-12 | End: 2024-05-13 | Stop reason: HOSPADM

## 2024-05-12 RX ORDER — BUSPIRONE HYDROCHLORIDE 5 MG/1
TABLET ORAL
COMMUNITY

## 2024-05-12 RX ORDER — TRAZODONE HYDROCHLORIDE 50 MG/1
25 TABLET ORAL NIGHTLY
COMMUNITY

## 2024-05-12 RX ORDER — SODIUM CHLORIDE 9 MG/ML
INJECTION, SOLUTION INTRAVENOUS CONTINUOUS
Status: ACTIVE | OUTPATIENT
Start: 2024-05-12 | End: 2024-05-13

## 2024-05-12 RX ORDER — LAMOTRIGINE 150 MG/1
150 TABLET ORAL DAILY
Status: DISCONTINUED | OUTPATIENT
Start: 2024-05-12 | End: 2024-05-13 | Stop reason: HOSPADM

## 2024-05-12 RX ORDER — MAGNESIUM SULFATE IN WATER 40 MG/ML
2000 INJECTION, SOLUTION INTRAVENOUS PRN
Status: DISCONTINUED | OUTPATIENT
Start: 2024-05-12 | End: 2024-05-13 | Stop reason: HOSPADM

## 2024-05-12 RX ORDER — ACETAMINOPHEN 650 MG/1
650 SUPPOSITORY RECTAL EVERY 6 HOURS PRN
Status: DISCONTINUED | OUTPATIENT
Start: 2024-05-12 | End: 2024-05-13 | Stop reason: HOSPADM

## 2024-05-12 RX ORDER — ONDANSETRON 2 MG/ML
4 INJECTION INTRAMUSCULAR; INTRAVENOUS ONCE
Status: DISCONTINUED | OUTPATIENT
Start: 2024-05-12 | End: 2024-05-12 | Stop reason: HOSPADM

## 2024-05-12 RX ORDER — ACETAMINOPHEN 325 MG/1
975 TABLET ORAL EVERY 8 HOURS
COMMUNITY

## 2024-05-12 RX ORDER — NALOXONE HYDROCHLORIDE 0.4 MG/ML
0.4 INJECTION, SOLUTION INTRAMUSCULAR; INTRAVENOUS; SUBCUTANEOUS PRN
Status: DISCONTINUED | OUTPATIENT
Start: 2024-05-12 | End: 2024-05-13 | Stop reason: HOSPADM

## 2024-05-12 RX ORDER — SODIUM CHLORIDE 0.9 % (FLUSH) 0.9 %
5-40 SYRINGE (ML) INJECTION EVERY 12 HOURS SCHEDULED
Status: DISCONTINUED | OUTPATIENT
Start: 2024-05-12 | End: 2024-05-13 | Stop reason: HOSPADM

## 2024-05-12 RX ORDER — POLYETHYLENE GLYCOL 3350 17 G/17G
17 POWDER, FOR SOLUTION ORAL DAILY PRN
Status: DISCONTINUED | OUTPATIENT
Start: 2024-05-12 | End: 2024-05-13 | Stop reason: HOSPADM

## 2024-05-12 RX ORDER — FLUOXETINE HYDROCHLORIDE 20 MG/1
40 CAPSULE ORAL 2 TIMES DAILY
Status: DISCONTINUED | OUTPATIENT
Start: 2024-05-12 | End: 2024-05-12

## 2024-05-12 RX ORDER — POTASSIUM CHLORIDE 20 MEQ/1
40 TABLET, EXTENDED RELEASE ORAL PRN
Status: DISCONTINUED | OUTPATIENT
Start: 2024-05-12 | End: 2024-05-13 | Stop reason: HOSPADM

## 2024-05-12 RX ORDER — AMLODIPINE BESYLATE 5 MG/1
5 TABLET ORAL DAILY
Status: DISCONTINUED | OUTPATIENT
Start: 2024-05-12 | End: 2024-05-12

## 2024-05-12 RX ORDER — LORAZEPAM 0.5 MG/1
0.5 TABLET ORAL EVERY 4 HOURS PRN
Status: DISCONTINUED | OUTPATIENT
Start: 2024-05-12 | End: 2024-05-13 | Stop reason: HOSPADM

## 2024-05-12 RX ORDER — METHOCARBAMOL 500 MG/1
500 TABLET, FILM COATED ORAL 3 TIMES DAILY
COMMUNITY

## 2024-05-12 RX ORDER — SODIUM CHLORIDE 0.9 % (FLUSH) 0.9 %
5-40 SYRINGE (ML) INJECTION PRN
Status: DISCONTINUED | OUTPATIENT
Start: 2024-05-12 | End: 2024-05-13 | Stop reason: HOSPADM

## 2024-05-12 RX ORDER — SODIUM CHLORIDE 9 MG/ML
INJECTION, SOLUTION INTRAVENOUS PRN
Status: DISCONTINUED | OUTPATIENT
Start: 2024-05-12 | End: 2024-05-13 | Stop reason: HOSPADM

## 2024-05-12 RX ORDER — TRAZODONE HYDROCHLORIDE 50 MG/1
25 TABLET ORAL NIGHTLY PRN
COMMUNITY

## 2024-05-12 RX ORDER — POTASSIUM CHLORIDE 7.45 MG/ML
10 INJECTION INTRAVENOUS PRN
Status: DISCONTINUED | OUTPATIENT
Start: 2024-05-12 | End: 2024-05-13 | Stop reason: HOSPADM

## 2024-05-12 RX ORDER — ACETAMINOPHEN 325 MG/1
650 TABLET ORAL EVERY 6 HOURS PRN
Status: DISCONTINUED | OUTPATIENT
Start: 2024-05-12 | End: 2024-05-13 | Stop reason: HOSPADM

## 2024-05-12 RX ORDER — ONDANSETRON 4 MG/1
4 TABLET, ORALLY DISINTEGRATING ORAL EVERY 8 HOURS PRN
Status: DISCONTINUED | OUTPATIENT
Start: 2024-05-12 | End: 2024-05-13 | Stop reason: HOSPADM

## 2024-05-12 RX ORDER — OXYCODONE HYDROCHLORIDE 5 MG/1
5 TABLET ORAL EVERY 6 HOURS PRN
COMMUNITY

## 2024-05-12 RX ORDER — OXYCODONE HYDROCHLORIDE 5 MG/1
10 TABLET ORAL EVERY 6 HOURS PRN
COMMUNITY

## 2024-05-12 RX ORDER — ENOXAPARIN SODIUM 100 MG/ML
40 INJECTION SUBCUTANEOUS DAILY
Status: DISCONTINUED | OUTPATIENT
Start: 2024-05-12 | End: 2024-05-13 | Stop reason: HOSPADM

## 2024-05-12 RX ORDER — METOPROLOL SUCCINATE 100 MG/1
100 TABLET, EXTENDED RELEASE ORAL DAILY
Status: DISCONTINUED | OUTPATIENT
Start: 2024-05-12 | End: 2024-05-12

## 2024-05-12 RX ORDER — ONDANSETRON 2 MG/ML
4 INJECTION INTRAMUSCULAR; INTRAVENOUS EVERY 6 HOURS PRN
Status: DISCONTINUED | OUTPATIENT
Start: 2024-05-12 | End: 2024-05-13 | Stop reason: HOSPADM

## 2024-05-12 RX ORDER — GABAPENTIN 400 MG/1
800 CAPSULE ORAL 3 TIMES DAILY
Status: DISCONTINUED | OUTPATIENT
Start: 2024-05-12 | End: 2024-05-13 | Stop reason: HOSPADM

## 2024-05-12 RX ORDER — NALOXONE HYDROCHLORIDE 4 MG/.1ML
1 SPRAY NASAL PRN
COMMUNITY

## 2024-05-12 RX ORDER — ASPIRIN 325 MG
325 TABLET ORAL DAILY
Status: DISCONTINUED | OUTPATIENT
Start: 2024-05-13 | End: 2024-05-13 | Stop reason: HOSPADM

## 2024-05-12 RX ORDER — POLYETHYLENE GLYCOL 3350 17 G/17G
17 POWDER, FOR SOLUTION ORAL 2 TIMES DAILY
COMMUNITY

## 2024-05-12 RX ADMIN — GABAPENTIN 800 MG: 400 CAPSULE ORAL at 21:33

## 2024-05-12 RX ADMIN — WATER 1000 MG: 1 INJECTION INTRAMUSCULAR; INTRAVENOUS; SUBCUTANEOUS at 14:55

## 2024-05-12 RX ADMIN — GABAPENTIN 800 MG: 400 CAPSULE ORAL at 18:15

## 2024-05-12 RX ADMIN — ENOXAPARIN SODIUM 40 MG: 100 INJECTION SUBCUTANEOUS at 12:11

## 2024-05-12 RX ADMIN — AZITHROMYCIN MONOHYDRATE 500 MG: 500 INJECTION, POWDER, LYOPHILIZED, FOR SOLUTION INTRAVENOUS at 14:56

## 2024-05-12 RX ADMIN — ATORVASTATIN CALCIUM 40 MG: 40 TABLET, FILM COATED ORAL at 21:33

## 2024-05-12 RX ADMIN — LAMOTRIGINE 150 MG: 150 TABLET ORAL at 21:33

## 2024-05-12 RX ADMIN — LORAZEPAM 0.5 MG: 0.5 TABLET ORAL at 12:11

## 2024-05-12 RX ADMIN — SODIUM CHLORIDE: 9 INJECTION, SOLUTION INTRAVENOUS at 12:25

## 2024-05-12 RX ADMIN — OXYCODONE AND ACETAMINOPHEN 1 TABLET: 10; 325 TABLET ORAL at 10:06

## 2024-05-12 RX ADMIN — GABAPENTIN 800 MG: 400 CAPSULE ORAL at 12:11

## 2024-05-12 ASSESSMENT — PAIN DESCRIPTION - DESCRIPTORS
DESCRIPTORS: ACHING
DESCRIPTORS: THROBBING;SORE

## 2024-05-12 ASSESSMENT — PAIN SCALES - GENERAL
PAINLEVEL_OUTOF10: 8
PAINLEVEL_OUTOF10: 8
PAINLEVEL_OUTOF10: 5

## 2024-05-12 ASSESSMENT — PAIN DESCRIPTION - PAIN TYPE: TYPE: SURGICAL PAIN

## 2024-05-12 ASSESSMENT — PAIN DESCRIPTION - ONSET
ONSET: ON-GOING
ONSET: ON-GOING

## 2024-05-12 ASSESSMENT — PAIN DESCRIPTION - FREQUENCY
FREQUENCY: CONTINUOUS
FREQUENCY: CONTINUOUS

## 2024-05-12 ASSESSMENT — PAIN DESCRIPTION - LOCATION
LOCATION: BACK
LOCATION: BACK

## 2024-05-12 ASSESSMENT — PAIN - FUNCTIONAL ASSESSMENT: PAIN_FUNCTIONAL_ASSESSMENT: PREVENTS OR INTERFERES WITH MANY ACTIVE NOT PASSIVE ACTIVITIES

## 2024-05-12 NOTE — H&P
History and Physical    Admit Date: 5/12/2024    Patient's PCP: Jon Benedict DO      Chief complaint: Dyspnea      HISTORY OF PRESENT ILLNESS:    This is a very pleasant 58 y.o. female with history of depression, Bipolar 1 disorder, chronic pain, narcotic dependent with recent fracture/dislocation of thoracic spine, treated with lumbosacral spine surgery, discharged to SNF, and recently discharged to home from SNF, who presented to [facility] ED  with dyspnea and cough.    She is a challenging historian, and appears very anxious, irritable and keeps yelling that she was sent for antibiotics, \"I want my antibiotics\".    Per ED documentation, she has had gradually worsening shortness of breath over the past week that is worse with exertion.  She says she has also had difficulty breathing, pointing to her throat/neck, and cough.    Per ED documentation, \"Over the past week in rehab she was experiencing dyspnea on exertion and a sensation of difficulty breathing though her pulse oximetry readings were noted to be \"good\".  She denies nausea, vomiting, chest pain except that \"I always hurt all over\".  She used to be in pain management and so she states she cannot tell what is new pain versus old pain.  She states that she cannot walk secondary to pain \"all over\".\"    She specifically denies chest pain for me. No palpitations, no dizziness, no syncope.       She had back surgery to have rods placed about 2 months ago at , after which she had complication requiring a second surgery about 2 weeks later.  Appears she was hospitalized for around 2 weeks and subsequently discharged to SNF     She was discharged from SNF day prior to ED presentation.     In the ED, she was afebrile.  Vital signs were fairly normal.  Lab work showed WBC 11.3.  Hemoglobin 10.9.  Platelet count 614.  Chemistries showed creatinine 0.6, bicarb 20, anion gap 17.  Troponin was 22, 22.     She had a chest x-ray,

## 2024-05-12 NOTE — PROGRESS NOTES
Patient does not know her medications . I asked if she was sent home with discharge instructions from the rehab facility.  When asked she starts getting upset and yelling.  I spoke with Jonna the pharmacist who is waiting for her rehab facility to fax over her medication list.

## 2024-05-12 NOTE — PROGRESS NOTES
4 Eyes Skin Assessment     NAME:  Marci Peterson  YOB: 1965  MEDICAL RECORD NUMBER:  [id number]    The patient is being assessed for  Admission    I agree that at least one RN has performed a thorough Head to Toe Skin Assessment on the patient. ALL assessment sites listed below have been assessed.      Areas assessed by both nurses:    Head, Face, Ears, Shoulders, Back, Chest, Arms, Elbows, Hands, Sacrum. Buttock, Coccyx, Ischium, and Legs. Feet and Heels        Does the Patient have a Wound? No noted wound(s)       Dontae Prevention initiated by RN: Yes  Wound Care Orders initiated by RN: No    Pressure Injury (Stage 3,4, Unstageable, DTI, NWPT, and Complex wounds) if present, place Wound referral order by RN under : No    New Ostomies, if present place, Ostomy referral order under : No     Nurse 1 eSignature: Electronically signed by Aleyda Millan RN on 5/12/24 at 6:29 PM EDT    **SHARE this note so that the co-signing nurse can place an eSignature**    Nurse 2 eSignature: {Esignature:[id number]}

## 2024-05-12 NOTE — PROGRESS NOTES
List of Home Medications the patient is currently taking is complete.  Home Medication list in EPIC updated to reflect changes noted below.  Source of medications in list is [facility] medications list. Patient was discharged 5/10/24.      Medications added:  Buspirone  Emgality  Slow-mag  Methocarbamol  Miralax  Naloxone  Senna-docusate  Trazodone  Acetaminophen    Medications removed:  Amlodipine  B-complex  Vitamin D  Co-Q10  Fluoxetine  Metoprolol    Medications adjusted:  Atorvastatin 10 mg daily --> 40 mg HS  Ferrous sulfate dose/instructions added  Hydroxyzine 10 mg TID --> 20 mg BID PRN  Lamotrigine 25 mg TID --> 150 mg daily  Percocet --> oxycodone 5-10 mg q6h PRN    Please call with any questions.  Jonna Francis, PharmD, [facility]  Main pharmacy: [id number]  5/12/2024 11:40 AM      Current Outpatient Medications   Medication Instructions    acetaminophen (TYLENOL) 975 mg, Oral, Every 8 hours    aspirin 325 mg, Oral, DAILY    atorvastatin (LIPITOR) 10 mg, Oral, DAILY    Blood Pressure KIT Test blood pressure twice daily  (please provide any unit on patients insurance formulary)    busPIRone (BUSPAR) 5 MG tablet Take 10 mg in AM and 5 mg HS    Emgality 120 mg, SubCUTAneous, EVERY 28 DAYS    gabapentin (NEURONTIN) 800 mg, Oral, 3 TIMES DAILY    hydrOXYzine HCl (ATARAX) 20 mg, Oral, 2 TIMES DAILY PRN    IRON  mg, Oral, 2 times daily    lamoTRIgine (LAMICTAL) 150 mg, Oral, DAILY    magnesium cl-calcium carbonate (SLOW-MAG) 71.5-119 MG TBEC tablet 1 tablet, Oral, DAILY    methocarbamol (ROBAXIN) 500 mg, Oral, 3 TIMES DAILY    mirtazapine (REMERON) 30 mg, Oral, NIGHTLY    Multiple Vitamins-Minerals (THERAPEUTIC MULTIVITAMIN-MINERALS) tablet 1 tablet, DAILY    naloxone 4 MG/0.1ML LIQD nasal spray 1 spray, Nasal, PRN    oxyCODONE (ROXICODONE) 5 mg, Oral, EVERY 6 HOURS PRN    oxyCODONE (ROXICODONE) 10 mg, Oral, EVERY 6 HOURS PRN    polyethylene glycol (GLYCOLAX) 17 g, Oral, 2 TIMES DAILY

## 2024-05-12 NOTE — FLOWSHEET NOTE
Patient extremely anxious and complaining of pain and SOB.  She states she needs a lot of help and shouldn't of been sent home

## 2024-05-12 NOTE — ED PROVIDER NOTES
[facility]  EMERGENCY DEPARTMENT ENCOUNTER      Pt Name: Marci Peterson  MRN: [id number]  Birthdate 1965  Date of evaluation: 5/11/2024  Provider: Kristina Webb MD    CHIEF COMPLAINT       Chief Complaint   Patient presents with    Shortness of Breath     Pt presents one week history of increased SOB that worsens with walking. Pt had a series of back surgeries and an ICU stay recently. She was discharged from rehab yesterday.          HISTORY OF PRESENT ILLNESS   (Location/Symptom, Timing/Onset, Context/Setting, Quality, Duration, Modifying Factors, Severity)  Note limiting factors.   Marci Peterson is a 58 y.o. female who presents to the emergency department  with gradually worsening shortness of breath over the past week that is worse with exertion.  She had back surgery to have rods placed about 2 months ago after which she had complication requiring a second surgery about 2 weeks later.  She has been in rehab since then and was discharged home yesterday.  Over the past week in rehab she was experiencing dyspnea on exertion and a sensation of difficulty breathing though her pulse oximetry readings have been \"good\".  She denies nausea, vomiting, chest pain except that \"I always hurt all over\".  She used to be in pain management and so she states she cannot tell what is new pain versus old pain.  She states that she cannot walk secondary to pain \"all over\".  She reports occasional cough that has \"clear liquid\" without \"phlegm\".  She states that she always has some leg swelling but no history of congestive heart failure and no history of heart problems.  She is not on blood thinners other than aspirin      This patient is at risk for a communicable infection. Therefore, personal protection equipment consisting of a mask and gloves worn for the exam.     Nursing Notes were reviewed.    REVIEW OF SYSTEMS    (2-9 systems for level 4, 10 or more for level 5)     As per

## 2024-05-12 NOTE — PROGRESS NOTES
Patient yelling she wants antibiotics.  Arms shaking .  She states she wants some answers.  She pulled off her gown and heart monitor.  Dr. Humphrey in to see her.  I gave her some Ativan

## 2024-05-13 VITALS
BODY MASS INDEX: 32.28 KG/M2 | HEIGHT: 64 IN | SYSTOLIC BLOOD PRESSURE: 134 MMHG | TEMPERATURE: 98.3 F | OXYGEN SATURATION: 95 % | RESPIRATION RATE: 18 BRPM | HEART RATE: 86 BPM | DIASTOLIC BLOOD PRESSURE: 83 MMHG

## 2024-05-13 LAB
ANION GAP SERPL CALCULATED.3IONS-SCNC: 14 MMOL/L (ref 3–16)
BASOPHILS # BLD: 0.1 K/UL (ref 0–0.2)
BASOPHILS NFR BLD: 0.8 %
BUN SERPL-MCNC: 3 MG/DL (ref 7–20)
CALCIUM SERPL-MCNC: 8.7 MG/DL (ref 8.3–10.6)
CHLORIDE SERPL-SCNC: 107 MMOL/L (ref 99–110)
CO2 SERPL-SCNC: 19 MMOL/L (ref 21–32)
CREAT SERPL-MCNC: 0.7 MG/DL (ref 0.6–1.2)
DEPRECATED RDW RBC AUTO: 15.7 % (ref 12.4–15.4)
EOSINOPHIL # BLD: 0.2 K/UL (ref 0–0.6)
EOSINOPHIL NFR BLD: 2.2 %
GFR SERPLBLD CREATININE-BSD FMLA CKD-EPI: >90 ML/MIN/{1.73_M2}
GLUCOSE SERPL-MCNC: 86 MG/DL (ref 70–99)
HCT VFR BLD AUTO: 33.8 % (ref 36–48)
HGB BLD-MCNC: 11 G/DL (ref 12–16)
LYMPHOCYTES # BLD: 2.1 K/UL (ref 1–5.1)
LYMPHOCYTES NFR BLD: 21.7 %
MCH RBC QN AUTO: 27.9 PG (ref 26–34)
MCHC RBC AUTO-ENTMCNC: 32.7 G/DL (ref 31–36)
MCV RBC AUTO: 85.5 FL (ref 80–100)
MONOCYTES # BLD: 1 K/UL (ref 0–1.3)
MONOCYTES NFR BLD: 9.8 %
NEUTROPHILS # BLD: 6.4 K/UL (ref 1.7–7.7)
NEUTROPHILS NFR BLD: 65.5 %
PLATELET # BLD AUTO: 504 K/UL (ref 135–450)
PMV BLD AUTO: 7.3 FL (ref 5–10.5)
POTASSIUM SERPL-SCNC: 3.9 MMOL/L (ref 3.5–5.1)
RBC # BLD AUTO: 3.95 M/UL (ref 4–5.2)
SODIUM SERPL-SCNC: 140 MMOL/L (ref 136–145)
TROPONIN, HIGH SENSITIVITY: 25 NG/L (ref 0–14)
WBC # BLD AUTO: 9.8 K/UL (ref 4–11)

## 2024-05-13 PROCEDURE — 97530 THERAPEUTIC ACTIVITIES: CPT

## 2024-05-13 PROCEDURE — 97166 OT EVAL MOD COMPLEX 45 MIN: CPT

## 2024-05-13 PROCEDURE — 85025 COMPLETE CBC W/AUTO DIFF WBC: CPT

## 2024-05-13 PROCEDURE — 97162 PT EVAL MOD COMPLEX 30 MIN: CPT

## 2024-05-13 PROCEDURE — 36415 COLL VENOUS BLD VENIPUNCTURE: CPT

## 2024-05-13 PROCEDURE — 97535 SELF CARE MNGMENT TRAINING: CPT

## 2024-05-13 PROCEDURE — 6370000000 HC RX 637 (ALT 250 FOR IP): Performed by: INTERNAL MEDICINE

## 2024-05-13 PROCEDURE — 6360000002 HC RX W HCPCS: Performed by: INTERNAL MEDICINE

## 2024-05-13 PROCEDURE — 2580000003 HC RX 258: Performed by: INTERNAL MEDICINE

## 2024-05-13 PROCEDURE — 84484 ASSAY OF TROPONIN QUANT: CPT

## 2024-05-13 PROCEDURE — 97116 GAIT TRAINING THERAPY: CPT

## 2024-05-13 PROCEDURE — 80048 BASIC METABOLIC PNL TOTAL CA: CPT

## 2024-05-13 RX ORDER — AMOXICILLIN AND CLAVULANATE POTASSIUM 875; 125 MG/1; MG/1
1 TABLET, FILM COATED ORAL 2 TIMES DAILY
Qty: 10 TABLET | Refills: 0 | Status: SHIPPED | OUTPATIENT
Start: 2024-05-13 | End: 2024-05-18

## 2024-05-13 RX ADMIN — LORAZEPAM 0.5 MG: 0.5 TABLET ORAL at 09:15

## 2024-05-13 RX ADMIN — ENOXAPARIN SODIUM 40 MG: 100 INJECTION SUBCUTANEOUS at 09:16

## 2024-05-13 RX ADMIN — GABAPENTIN 800 MG: 400 CAPSULE ORAL at 09:15

## 2024-05-13 RX ADMIN — Medication 5 ML: at 09:00

## 2024-05-13 RX ADMIN — ASPIRIN 325 MG: 325 TABLET ORAL at 09:15

## 2024-05-13 RX ADMIN — OXYCODONE AND ACETAMINOPHEN 1 TABLET: 10; 325 TABLET ORAL at 09:15

## 2024-05-13 RX ADMIN — GABAPENTIN 800 MG: 400 CAPSULE ORAL at 13:51

## 2024-05-13 RX ADMIN — LAMOTRIGINE 150 MG: 150 TABLET ORAL at 09:19

## 2024-05-13 ASSESSMENT — PAIN DESCRIPTION - FREQUENCY
FREQUENCY: INTERMITTENT
FREQUENCY: INTERMITTENT

## 2024-05-13 ASSESSMENT — PAIN DESCRIPTION - DESCRIPTORS
DESCRIPTORS: ACHING;SHOOTING
DESCRIPTORS: ACHING;SHOOTING

## 2024-05-13 ASSESSMENT — PAIN DESCRIPTION - PAIN TYPE
TYPE: ACUTE PAIN;SURGICAL PAIN
TYPE: ACUTE PAIN;SURGICAL PAIN

## 2024-05-13 ASSESSMENT — PAIN - FUNCTIONAL ASSESSMENT
PAIN_FUNCTIONAL_ASSESSMENT: PREVENTS OR INTERFERES SOME ACTIVE ACTIVITIES AND ADLS
PAIN_FUNCTIONAL_ASSESSMENT: PREVENTS OR INTERFERES SOME ACTIVE ACTIVITIES AND ADLS

## 2024-05-13 ASSESSMENT — PAIN SCALES - GENERAL
PAINLEVEL_OUTOF10: 7
PAINLEVEL_OUTOF10: 2

## 2024-05-13 ASSESSMENT — PAIN DESCRIPTION - ONSET
ONSET: GRADUAL
ONSET: GRADUAL

## 2024-05-13 ASSESSMENT — PAIN DESCRIPTION - LOCATION
LOCATION: BACK
LOCATION: BACK

## 2024-05-13 NOTE — PLAN OF CARE
Problem: Discharge Planning  Goal: Discharge to home or other facility with appropriate resources  5/13/2024 1623 by Sandra Marie LPN  Outcome: Adequate for Discharge  5/13/2024 0605 by Cierra Rocha RN  Outcome: Progressing     Problem: ABCDS Injury Assessment  Goal: Absence of physical injury  5/13/2024 1623 by Sandra Marie LPN  Outcome: Adequate for Discharge  5/13/2024 0605 by Cierra Rocha, RN  Outcome: Progressing     Problem: Skin/Tissue Integrity  Goal: Absence of new skin breakdown  Description: 1.  Monitor for areas of redness and/or skin breakdown  2.  Assess vascular access sites hourly  3.  Every 4-6 hours minimum:  Change oxygen saturation probe site  4.  Every 4-6 hours:  If on nasal continuous positive airway pressure, respiratory therapy assess nares and determine need for appliance change or resting period.  5/13/2024 1623 by Sandra Marie LPN  Outcome: Adequate for Discharge  5/13/2024 0605 by Cierra Rocha, RN  Outcome: Progressing     Problem: Pain  Goal: Verbalizes/displays adequate comfort level or baseline comfort level  5/13/2024 1623 by Sandra Marie LPN  Outcome: Adequate for Discharge  5/13/2024 0605 by Cierra Rocha, RN  Outcome: Progressing     Problem: Safety - Adult  Goal: Free from fall injury  5/13/2024 1623 by Sandra Marie LPN  Outcome: Adequate for Discharge  5/13/2024 0605 by Cierra Rocha, RN  Outcome: Progressing

## 2024-05-13 NOTE — CARE COORDINATION
Case Management Assessment  Initial Evaluation    Date/Time of Evaluation: 5/12/2024 9:37 AM  Assessment Completed by: Andree Ash RN    If patient is discharged prior to next notation, then this note serves as note for discharge by case management.    Patient Name: Marci Peterson                   YOB: 1965  Diagnosis: Chest pain [R07.9]  Dyspnea [R06.00]                   Date / Time: 5/12/2024  6:19 AM    Patient Admission Status: Inpatient   Readmission Risk (Low < 19, Mod (19-27), High > 27): Readmission Risk Score: 12.3    Current PCP: Jon Dixon, DO  PCP verified by CM? Yes (Ed Shue)    Chart Reviewed: Yes      History Provided by: Patient  Patient Orientation: Alert and Oriented    Patient Cognition: Alert    Hospitalization in the last 30 days (Readmission):  No    If yes, Readmission Assessment in CM Navigator will be completed.    Advance Directives:      Code Status: Full Code   Patient's Primary Decision Maker is: Legal Next of Kin      Discharge Planning:    Patient lives with: Friends Type of Home: House  Primary Care Giver: Self  Patient Support Systems include: Children, Friends/Neighbors   Current Financial resources: Medicare  Current community resources: None  Current services prior to admission: Durable Medical Equipment, Home Care            Current DME: Walker            Type of Home Care services:  OT, PT    ADLS  Prior functional level: Assistance with the following:, Housework, Shopping  Current functional level: Assistance with the following:, Housework, Shopping    PT AM-PAC:   /24  OT AM-PAC:   /24    Family can provide assistance at DC: No  Would you like Case Management to discuss the discharge plan with any other family members/significant others, and if so, who? No  Plans to Return to Present Housing: Yes  Other Identified Issues/Barriers to RETURNING to current housing: n/a  Potential Assistance needed at discharge: Home Care            Potential DME:  
or card accepted)     Able to afford home medications/ co-pay costs: Yes    ADLS:  Current PT AM-PAC Score:   /24  Current OT AM-PAC Score:   /24    DISCHARGE Disposition: Home with Home Health Care: SN PTOT       LOC at discharge: Skilled  OSMAN Completed: Yes    Notification completed in HENS/PAS?:  Not Applicable    IMM Completed:   No         Transportation:  Transportation PLAN for discharge: family   Mode of Transport: Private Car  Reason for medical transport: Not Applicable  Name of Transport Company: Not Applicable  Time of Transport: between  2-3  PM  ( son to transport)     Transport form completed: No    Home Care:  Home Care ordered at discharge: Yes  Home Care Agency:     [facility] [employer]  Services: Home Health Services   Address: [address] [place] [address], [address] [address]   Phone: [phone]       Orders faxed: Yes    Durable Medical Equipment:  DME Provider: [facility]  Equipment obtained during hospitalization: NA    Home Oxygen and Respiratory Equipment:  Oxygen needed at discharge?: No  Home Oxygen Company: Not Applicable  Portable tank available for discharge?: Not Indicated    Dialysis:  Dialysis patient: No    Dialysis Center:  Not Applicable    Hospice Services:  Location: Not Applicable  Agency: Not Applicable    Consents signed: No    Referrals made at DISCHARGE for outpatient continued care:  Not Applicable    Additional CM Notes:     CM  confirmed  d/c home today    Patient recently at  [facility]  and  CM  spoke with  Admissions to confirm  Name  of  [facility]  agency:  They  made  referral  to  [facility] [employer]  Services: Home Health Services   Address: [address] [place] [address], [address], [facility] [address]   Phone: [phone]     CM  faxed referral  to  let  them know she was  d/c ing home from here.       CM to fax   orders  and update  agency.     Patient states her son jimenez be

## 2024-05-13 NOTE — PROGRESS NOTES
05/13/24 1225   Resting (Room Air)   SpO2 98   HR 82   During Walk (Room Air)   SpO2 95   HR 88   Walk/Assistance Device Walker   Rate of Dyspnea 1   After Walk   SpO2 96   HR 86   O2 Device Nasal cannula   O2 Flow Rate (l/min) 0 l/min   Does the Patient Qualify for Home O2 No   Does the Patient Need Portable Oxygen Tanks No

## 2024-05-13 NOTE — PROGRESS NOTES
Occupational Therapy  Facility/Department: [address]  Occupational Therapy Initial Assessment/ Treatment    Name: Marci Peterson  : 1965  MRN: [id number]  Date of Service: 2024    Discharge Recommendations:  24 hour supervision or assist  OT Equipment Recommendations  Equipment Needed: No       Patient Diagnosis(es): There were no encounter diagnoses.  Past Medical History:  has a past medical history of Atrophic kidney, Brachial neuritis or radiculitis NOS, Degeneration of cervical intervertebral disc, Depression, Hyperlipidemia, Hypertension, Neck pain, Shoulder pain, and Single kidney.  Past Surgical History:  has a past surgical history that includes Knee arthroscopy (Left); Tonsillectomy; other surgical history (4/15/14); other surgical history (2014); back surgery; lumbar laminectomy (2017); and Colonoscopy (N/A, 2019).    Treatment Diagnosis: Impaired safety awareness      Assessment   Performance deficits / Impairments: Decreased balance;Decreased ROM;Decreased safe awareness  Assessment: Pt from home and admitted to hospital due to chest pain. Pt has had recent back surgery (March) and has h/o chronic pain. Pt performance near baseline but Pt demonstrates impulsiviy during treatment and lack of safety awareness when performing functional mobility. Pt also demonstrated difficulty performing ADL of grooming due to limited UE ROM. Recommend home with assist PRN. Will follow with OT.  Treatment Diagnosis: Impaired safety awareness  Prognosis: Good  Decision Making: Medium Complexity  REQUIRES OT FOLLOW-UP: Yes  Activity Tolerance  Activity Tolerance: Patient Tolerated treatment well        Plan   Occupational Therapy Plan  Times Per Week: 2-5  Current Treatment Recommendations: Balance training, Functional mobility training, Safety education & training, Self-Care / ADL     Restrictions  Position Activity Restriction  Other position/activity restrictions: Up with

## 2024-05-13 NOTE — PLAN OF CARE
Problem: Pain  Goal: Verbalizes/displays adequate comfort level or baseline comfort level  Outcome: Progressing   Denies any pain at this time  Problem: Safety - Adult  Goal: Free from fall injury  Outcome: Progressing   Remains free from falls. Protocol in place.

## 2024-05-13 NOTE — DISCHARGE INSTR - COC
Continuity of Care Form    Patient Name: Marci Peterson   :  1965  MRN:  [id number]    Admit date:  2024  Discharge date:  24      Code Status Order: Full Code   Advance Directives:     Admitting Physician:  Dillon Humphrey MD  PCP: Jon Dixon DO    Discharging Nurse: Magdalene    Discharging Hospital Unit/Room#: 6301/6301-01  Discharging Unit Phone Number: ***    Emergency Contact:   Extended Emergency Contact Information  Primary Emergency Contact: Jon Santoro  Home Phone: [phone]  Mobile Phone: [phone]  Relation: Child   needed? No  Secondary Emergency Contact: Barbara Garnett  Home Phone: [phone]  Relation: Parent   needed? No    Past Surgical History:  Past Surgical History:   Procedure Laterality Date    BACK SURGERY      cervical , ,     COLONOSCOPY N/A 2019    COLONOSCOPY POLYPECTOMY SNARE/COLD BIOPSY performed by Young Galeana MD at [facility] ENDOSCOPY    KNEE ARTHROSCOPY Left     LUMBAR LAMINECTOMY  2017    L5-S1;transforaminal fusion,facetectomy,foraminotomy    OTHER SURGICAL HISTORY  4/15/14    SPINAL CORD STIMULATOR TRIAL WITH PADDLE LEADS -    OTHER SURGICAL HISTORY  2014    CERVICAL LAMINECTOMY AND IMPLANT SPINAL CORD STIMULATOR    TONSILLECTOMY         Immunization History:   Immunization History   Administered Date(s) Administered    TDaP, ADACEL (age 10y-64y), BOOSTRIX (age 10y+), IM, 0.5mL 2018       Active Problems:  Patient Active Problem List   Diagnosis Code    CRPS (complex regional pain syndrome) [id number]    Essential hypertension I10    Degeneration of cervical intervertebral disc M50.30    Spinal stenosis of lumbar region with neurogenic claudication M48.062    Bipolar 1 disorder (HCC) F31.9    Atrophic kidney N26.1    Elevated BP without diagnosis of hypertension R03.0    Chronic kidney disease (CKD), stage III (moderate) (HCC) N18.30    Aortic atherosclerosis (HCC) I70.0    Coronary artery

## 2024-05-13 NOTE — PROGRESS NOTES
Physical Therapy  Facility/Department: [address]  Physical Therapy Initial Assessment    Name: Marci Peterson  : 1965  MRN: [id number]  Date of Service: 2024    Discharge Recommendations:  24 hour supervision or assist, Home with Home health PT   PT Equipment Recommendations  Equipment Needed: No      Assessment   Assessment: Pt from home with chest pain.  Pt doing fairly well from PT standpoint, demonstrating transfers and gait close to functional baseline.  Pt impulsive with mobility.  Pt demosntrates increased balance using rolling walker vs without.  SpO2 95% post ambulation on room air.  Recommend continued use of rolling walker at home to ensure safety.  Will plan to follow for ongoing PT in hospital setting to maximize mobility.  Recommend home PT.  Treatment Diagnosis: Decreased gait assocaited with Chest pain.  Decision Making: Medium Complexity  Requires PT Follow-Up: Yes     Plan   General Plan:  (2-5)  Current Treatment Recommendations: Transfer training, Functional mobility training, Strengthening, Gait training, Safety education & training, Patient/Caregiver education & training, Stair training    Safety Devices  Type of Devices: Left in bed, Bed alarm in place, Call light within reach, Nurse notified     Restrictions  Other position/activity restrictions: Up with assist     Subjective   Pain: 7/10 back pain, RN made aware.    Chart Reviewed: Yes  Additional Pertinent Hx: Pt to ED  with worsening shortness of breath over the past week that is worse with exertion.  Pt admitted for chest pain.  CXR (-).  Chest CT (-) PE.  PMH:  HTN, depression, neck pain.    Diagnosis: Chest pain    Subjective  Subjective: Pt found supine in bed.  Pt agreeable for PT.  \"I don't want that walker.\"    Social/Functional History  Lives With: Friend(s)  Type of Home: House  Home Layout: One level  Home Access: Stairs to enter with rails  Entrance Stairs - Number of Steps: 5 VIMAL  Entrance

## 2024-05-13 NOTE — DISCHARGE INSTRUCTIONS
- Complete 5 days of antibiotics after discharge with augmentin once in the morning and once in the evening  - See attached for instructions on using incentive spirometer at home  - Follow up with your PCP in 1-2 weeks to make sure your shortness of breath is improving

## 2024-05-13 NOTE — PROGRESS NOTES
IV and tele removed prior to discharge, patient's son transported patient back home. AVS discussed with patient and all questions answered. Antibiotics picked up from outpatient pharmacy. All belongings sent with patient.

## 2024-05-14 ENCOUNTER — TELEPHONE (OUTPATIENT)
Dept: FAMILY MEDICINE CLINIC | Age: 59
End: 2024-05-14

## 2024-05-14 NOTE — TELEPHONE ENCOUNTER
Care Transitions Initial Follow Up Call    Outreach made within 2 business days of discharge: Yes    Patient: Marci Peterson Patient : 1965   MRN: <[id number]>  Reason for Admission: There are no discharge diagnoses documented for the most recent discharge.  Discharge Date: 24       Spoke with: No longer a patient of Dr. Casper. Patient's PCP is Leonela Estrada MD with [facility].     Discharge department/facility: [facility]         Scheduled appointment with PCP within 7-14 days    Follow Up  No future appointments.    Jamaica Velez MA

## (undated) DEVICE — SNARE ENDOSCP L240CM LOOP W13MM DIA2.4MM SHT THROW SM OVL